# Patient Record
Sex: MALE | Race: BLACK OR AFRICAN AMERICAN | NOT HISPANIC OR LATINO | Employment: UNEMPLOYED | URBAN - METROPOLITAN AREA
[De-identification: names, ages, dates, MRNs, and addresses within clinical notes are randomized per-mention and may not be internally consistent; named-entity substitution may affect disease eponyms.]

---

## 2017-09-12 ENCOUNTER — HOSPITAL ENCOUNTER (EMERGENCY)
Facility: HOSPITAL | Age: 32
Discharge: HOME | End: 2017-09-12
Attending: EMERGENCY MEDICINE
Payer: COMMERCIAL

## 2017-09-12 VITALS
BODY MASS INDEX: 25.84 KG/M2 | OXYGEN SATURATION: 100 % | DIASTOLIC BLOOD PRESSURE: 69 MMHG | TEMPERATURE: 98 F | WEIGHT: 175 LBS | SYSTOLIC BLOOD PRESSURE: 127 MMHG | RESPIRATION RATE: 17 BRPM | HEART RATE: 71 BPM

## 2017-09-12 DIAGNOSIS — R10.9 LEFT FLANK PAIN: Primary | ICD-10-CM

## 2017-09-12 LAB
ALBUMIN SERPL BCP-MCNC: 3.9 G/DL
ALP SERPL-CCNC: 62 U/L
ALT SERPL W/O P-5'-P-CCNC: 20 U/L
ANION GAP SERPL CALC-SCNC: 8 MMOL/L
AST SERPL-CCNC: 22 U/L
BASOPHILS # BLD AUTO: 0 K/UL
BASOPHILS NFR BLD: 0.3 %
BILIRUB SERPL-MCNC: 0.9 MG/DL
BILIRUB UR QL STRIP: NEGATIVE
BUN SERPL-MCNC: 15 MG/DL
CALCIUM SERPL-MCNC: 9.2 MG/DL
CHLORIDE SERPL-SCNC: 105 MMOL/L
CLARITY UR: CLEAR
CO2 SERPL-SCNC: 27 MMOL/L
COLOR UR: YELLOW
CREAT SERPL-MCNC: 0.8 MG/DL
DIFFERENTIAL METHOD: ABNORMAL
EOSINOPHIL # BLD AUTO: 0.2 K/UL
EOSINOPHIL NFR BLD: 3.2 %
ERYTHROCYTE [DISTWIDTH] IN BLOOD BY AUTOMATED COUNT: 12.9 %
EST. GFR  (AFRICAN AMERICAN): >60 ML/MIN/1.73 M^2
EST. GFR  (NON AFRICAN AMERICAN): >60 ML/MIN/1.73 M^2
GLUCOSE SERPL-MCNC: 76 MG/DL
GLUCOSE UR QL STRIP: NEGATIVE
HCT VFR BLD AUTO: 41.5 %
HGB BLD-MCNC: 14.6 G/DL
HGB UR QL STRIP: NEGATIVE
KETONES UR QL STRIP: NEGATIVE
LEUKOCYTE ESTERASE UR QL STRIP: NEGATIVE
LYMPHOCYTES # BLD AUTO: 1.7 K/UL
LYMPHOCYTES NFR BLD: 25.1 %
MCH RBC QN AUTO: 31.5 PG
MCHC RBC AUTO-ENTMCNC: 35.1 G/DL
MCV RBC AUTO: 90 FL
MONOCYTES # BLD AUTO: 0.3 K/UL
MONOCYTES NFR BLD: 4.8 %
NEUTROPHILS # BLD AUTO: 4.6 K/UL
NEUTROPHILS NFR BLD: 66.6 %
NITRITE UR QL STRIP: NEGATIVE
PH UR STRIP: 7 [PH] (ref 5–8)
PLATELET # BLD AUTO: 214 K/UL
PMV BLD AUTO: 8.9 FL
POTASSIUM SERPL-SCNC: 3.8 MMOL/L
PROT SERPL-MCNC: 7 G/DL
PROT UR QL STRIP: NEGATIVE
RBC # BLD AUTO: 4.62 M/UL
SODIUM SERPL-SCNC: 140 MMOL/L
SP GR UR STRIP: 1.01 (ref 1–1.03)
URN SPEC COLLECT METH UR: NORMAL
UROBILINOGEN UR STRIP-ACNC: NEGATIVE EU/DL
WBC # BLD AUTO: 6.9 K/UL

## 2017-09-12 PROCEDURE — 81003 URINALYSIS AUTO W/O SCOPE: CPT

## 2017-09-12 PROCEDURE — 25000003 PHARM REV CODE 250: Performed by: PHYSICIAN ASSISTANT

## 2017-09-12 PROCEDURE — 36415 COLL VENOUS BLD VENIPUNCTURE: CPT

## 2017-09-12 PROCEDURE — 96374 THER/PROPH/DIAG INJ IV PUSH: CPT

## 2017-09-12 PROCEDURE — 99284 EMERGENCY DEPT VISIT MOD MDM: CPT | Mod: 25

## 2017-09-12 PROCEDURE — 80053 COMPREHEN METABOLIC PANEL: CPT

## 2017-09-12 PROCEDURE — 63600175 PHARM REV CODE 636 W HCPCS: Performed by: PHYSICIAN ASSISTANT

## 2017-09-12 PROCEDURE — 85025 COMPLETE CBC W/AUTO DIFF WBC: CPT

## 2017-09-12 RX ORDER — KETOROLAC TROMETHAMINE 30 MG/ML
15 INJECTION, SOLUTION INTRAMUSCULAR; INTRAVENOUS
Status: COMPLETED | OUTPATIENT
Start: 2017-09-12 | End: 2017-09-12

## 2017-09-12 RX ORDER — NAPROXEN 500 MG/1
500 TABLET ORAL 2 TIMES DAILY WITH MEALS
Qty: 10 TABLET | Refills: 0 | Status: SHIPPED | OUTPATIENT
Start: 2017-09-12 | End: 2017-09-17

## 2017-09-12 RX ORDER — METHOCARBAMOL 500 MG/1
500 TABLET, FILM COATED ORAL 3 TIMES DAILY
Qty: 15 TABLET | Refills: 0 | Status: SHIPPED | OUTPATIENT
Start: 2017-09-12 | End: 2017-09-17

## 2017-09-12 RX ADMIN — KETOROLAC TROMETHAMINE 15 MG: 30 INJECTION, SOLUTION INTRAMUSCULAR at 01:09

## 2017-09-12 RX ADMIN — SODIUM CHLORIDE 1000 ML: 0.9 INJECTION, SOLUTION INTRAVENOUS at 01:09

## 2017-09-12 NOTE — ED PROVIDER NOTES
Encounter Date: 9/12/2017       History     Chief Complaint   Patient presents with    Flank Pain     left. reports hx of kidney stone     Patient is a 32-year-old male who presents with left flank pain for 2 days.  He reports history of prior renal stones.  He reports associated dysuria.  He also reports mild nausea.  He denied fever, chills, vomiting, hematuria or change in bowel habits.  He denied any trauma.  He denied any attempted treatment.      The history is provided by the patient.     Review of patient's allergies indicates:  No Known Allergies  History reviewed. No pertinent past medical history.  Past Surgical History:   Procedure Laterality Date    FRACTURE SURGERY      right hand     History reviewed. No pertinent family history.  Social History   Substance Use Topics    Smoking status: Never Smoker    Smokeless tobacco: Never Used    Alcohol use Yes      Comment: occasionally     Review of Systems   Constitutional: Negative for chills and fever.   HENT: Negative for congestion and sore throat.    Respiratory: Negative for cough and shortness of breath.    Cardiovascular: Negative for chest pain.   Gastrointestinal: Positive for nausea. Negative for abdominal pain, diarrhea and vomiting.   Genitourinary: Positive for dysuria and flank pain. Negative for discharge, frequency, penile swelling and urgency.   Musculoskeletal: Negative for back pain.   Skin: Negative for rash.   Neurological: Negative for weakness.   Hematological: Does not bruise/bleed easily.       Physical Exam     Initial Vitals [09/12/17 1234]   BP Pulse Resp Temp SpO2   127/69 71 17 98.4 °F (36.9 °C) 100 %      MAP       88.33         Physical Exam    Nursing note and vitals reviewed.  Constitutional: He appears well-developed and well-nourished.   HENT:   Head: Normocephalic and atraumatic.   Right Ear: External ear normal.   Left Ear: External ear normal.   Nose: Nose normal.   Eyes: Conjunctivae are normal. Right eye exhibits  no discharge. Left eye exhibits no discharge. No scleral icterus.   Neck: Normal range of motion. Neck supple.   Cardiovascular: Normal rate, regular rhythm and normal heart sounds. Exam reveals no gallop and no friction rub.    No murmur heard.  Pulmonary/Chest: Breath sounds normal. He has no wheezes. He has no rhonchi. He has no rales.   Abdominal: Soft. Bowel sounds are normal. He exhibits no distension. There is no tenderness. There is CVA tenderness. There is no rigidity, no rebound and no guarding.   Mild left CVA and flank tenderness. Abdomen is soft and non-tender. There is no rebound or guarding.   Musculoskeletal: Normal range of motion. He exhibits no tenderness.   Neurological: He is oriented to person, place, and time.   Skin: Skin is warm and dry.         ED Course   Procedures  Labs Reviewed   CBC W/ AUTO DIFFERENTIAL - Abnormal; Notable for the following:        Result Value    MCH 31.5 (*)     MPV 8.9 (*)     All other components within normal limits   COMPREHENSIVE METABOLIC PANEL   URINALYSIS             Medical Decision Making:   History:   I obtained history from: someone other than patient.  Old Medical Records: I decided to obtain old medical records.  Clinical Tests:   Lab Tests: Ordered  Radiological Study: Ordered       APC / Resident Notes:   This is an urgent evaluation of a 32-year-old male who presents with left flank pain with associated nausea and dysuria.  He is well-appearing.  Vital signs are stable.  He has left CVA and flank pain.  There are no skin changes.  There is no bony tenderness to the ribs.  There are equal breath sounds laterally.  His abdomen is soft and nontender.  There is no rebound or guarding.  Labs are unremarkable.  UA is negative.  CT scan shows no renal stone.  He was given Toradol with resolution of his symptoms.  Suspect that secondary to musculoskeletal cause. Discussed results with patient. Return precautions given. Patient is to follow up with their  primary care provider. Case was discussed with Dr. Agosto who is in agreement with the plan of care. All questions answered.                 ED Course      Clinical Impression:   The encounter diagnosis was Left flank pain.                           Margo Phoenix PA-C  09/12/17 [id number]

## 2017-09-12 NOTE — DISCHARGE INSTRUCTIONS
Take medication as prescribed.  See your primary care provider in one week.  For worsening symptoms, chest pain, shortness of breath, increased abdominal pain, high grade fever, stroke or stroke like symptoms, immediately go to the nearest Emergency Room or call 911 as soon as possible.

## 2019-09-16 ENCOUNTER — TELEPHONE (OUTPATIENT)
Dept: UROLOGY | Facility: CLINIC | Age: 34
End: 2019-09-16

## 2019-09-16 ENCOUNTER — HOSPITAL ENCOUNTER (OUTPATIENT)
Dept: RADIOLOGY | Facility: HOSPITAL | Age: 34
Discharge: HOME | End: 2019-09-16
Attending: UROLOGY
Payer: COMMERCIAL

## 2019-09-16 ENCOUNTER — OFFICE VISIT (OUTPATIENT)
Dept: UROLOGY | Facility: CLINIC | Age: 34
End: 2019-09-16
Payer: COMMERCIAL

## 2019-09-16 VITALS
WEIGHT: 179.69 LBS | BODY MASS INDEX: 26.62 KG/M2 | HEART RATE: 92 BPM | DIASTOLIC BLOOD PRESSURE: 78 MMHG | TEMPERATURE: 98 F | SYSTOLIC BLOOD PRESSURE: 128 MMHG | HEIGHT: 69 IN

## 2019-09-16 DIAGNOSIS — N45.3 ORCHITIS, EPIDIDYMITIS, AND EPIDIDYMO-ORCHITIS: Primary | ICD-10-CM

## 2019-09-16 DIAGNOSIS — Z87.442 HISTORY OF KIDNEY STONES: ICD-10-CM

## 2019-09-16 DIAGNOSIS — R10.9 LEFT FLANK PAIN: ICD-10-CM

## 2019-09-16 DIAGNOSIS — N45.1 ORCHITIS, EPIDIDYMITIS, AND EPIDIDYMO-ORCHITIS: Primary | ICD-10-CM

## 2019-09-16 DIAGNOSIS — N45.2 ORCHITIS, EPIDIDYMITIS, AND EPIDIDYMO-ORCHITIS: Primary | ICD-10-CM

## 2019-09-16 PROCEDURE — 3008F PR BODY MASS INDEX (BMI) DOCUMENTED: ICD-10-PCS | Mod: CPTII,S$GLB,, | Performed by: UROLOGY

## 2019-09-16 PROCEDURE — 3008F BODY MASS INDEX DOCD: CPT | Mod: CPTII,S$GLB,, | Performed by: UROLOGY

## 2019-09-16 PROCEDURE — 74018 XR ABDOMEN AP 1 VIEW: ICD-10-PCS | Mod: 26,,, | Performed by: RADIOLOGY

## 2019-09-16 PROCEDURE — 99999 PR PBB SHADOW E&M-EST. PATIENT-LVL III: CPT | Mod: PBBFAC,,, | Performed by: UROLOGY

## 2019-09-16 PROCEDURE — 99203 OFFICE O/P NEW LOW 30 MIN: CPT | Mod: S$GLB,,, | Performed by: UROLOGY

## 2019-09-16 PROCEDURE — 74018 RADEX ABDOMEN 1 VIEW: CPT | Mod: TC,FY

## 2019-09-16 PROCEDURE — 99999 PR PBB SHADOW E&M-EST. PATIENT-LVL III: ICD-10-PCS | Mod: PBBFAC,,, | Performed by: UROLOGY

## 2019-09-16 PROCEDURE — 74018 RADEX ABDOMEN 1 VIEW: CPT | Mod: 26,,, | Performed by: RADIOLOGY

## 2019-09-16 PROCEDURE — 99203 PR OFFICE/OUTPT VISIT, NEW, LEVL III, 30-44 MIN: ICD-10-PCS | Mod: S$GLB,,, | Performed by: UROLOGY

## 2019-09-16 RX ORDER — DOXYCYCLINE 100 MG/1
100 CAPSULE ORAL 2 TIMES DAILY
Qty: 20 CAPSULE | Refills: 0 | Status: SHIPPED | OUTPATIENT
Start: 2019-09-16 | End: 2023-03-30

## 2019-09-16 RX ORDER — CIPROFLOXACIN 500 MG/1
500 TABLET ORAL 2 TIMES DAILY
Qty: 20 TABLET | Refills: 0 | Status: SHIPPED | OUTPATIENT
Start: 2019-09-16 | End: 2019-09-26

## 2019-09-16 NOTE — TELEPHONE ENCOUNTER
Spoke w pt regarding reason for same day visit with fever and chills. Pt was informed will need to visit ER for symptoms for further workup which pt declined due to high ER deductible. Pt voiced that he is currently not experincing any  fever or chills today per pt hasn't occurred since Friday only having kidney pain. Pt will keep appt informed.

## 2019-09-16 NOTE — TELEPHONE ENCOUNTER
----- Message from Herbie Ross sent at 9/16/2019  1:23 PM CDT -----  Contact: patient   Pt need to speak with a nurse regarding his prescriptions, please call back at [phone]

## 2019-09-16 NOTE — TELEPHONE ENCOUNTER
Patient with known history of kidney stones just added on by phone room with note - pain since Thursday, chills, fever.    Please advise pt that with these symptoms he should present to emergency room asap. Should not eat or drink until worked up further

## 2019-09-16 NOTE — PROGRESS NOTES
[facility]rology New Patient/H&P:    Bonifacio Bower is a 34 y.o. male who presents for urgent evaluation of flank pain, fever, chills, with history of kidney stones    ER 11/18/16: SP/flank pain. 3 mm left UVJ stone without hydronephrosis. Pt's pain is being appropriately controlled. Discharge on Flomax, antiemetics, and pain meds. Provided contact for urologist.  ER 9/12/17: L flank pain x2d with dysuria, +L CVAT. CT scan shows no renal stone.  He was given Toradol with resolution of his symptoms.  Suspect that secondary to musculoskeletal cause    He presents today noting:  Doesn't feel like a stone per se, though has had a kidney infection before and it feels like that  4d ago woke up with chills and had 102 fever and took tylenol and since then has had urinary frequency since then.  Feels tense lower back above hips, radiating towards groin, though this is both sides.  Has had stones 4x and 2 kidney infections. Stones started around 2011. Always passed them. Never caught/strained.  Only saw urology, Dr Delong, last year for vasectomy - no pain no mention of or workup of stones  No perineal, perirectal pain. No pain with ejaculation. Has had those symptoms before and evaluated by Dr Delong and noted nothing wrong.  No dysuria.  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  No high risk sexual encounters or STD concerns  When started feeling febrile had lower back aches.  Changed diet to cut out soft drinks, limited red meat, etc and hasnt had further problems with kidneys    History reviewed. No pertinent past medical history.    Past Surgical History:   Procedure Laterality Date    FRACTURE SURGERY      right hand       History reviewed. No pertinent family history.    Social History     Socioeconomic History    Marital status:      Spouse name: Not on file    Number of children: Not  on file    Years of education: Not on file    Highest education level: Not on file   Occupational History    Not on file   Social Needs    Financial resource strain: Not on file    Food insecurity:     Worry: Not on file     Inability: Not on file    Transportation needs:     Medical: Not on file     Non-medical: Not on file   Tobacco Use    Smoking status: Never Smoker    Smokeless tobacco: Never Used   Substance and Sexual Activity    Alcohol use: Yes     Comment: occasionally    Drug use: No    Sexual activity: Yes     Partners: Female   Lifestyle    Physical activity:     Days per week: Not on file     Minutes per session: Not on file    Stress: Not on file   Relationships    Social connections:     Talks on phone: Not on file     Gets together: Not on file     Attends Orthodoxy service: Not on file     Active member of club or organization: Not on file     Attends meetings of clubs or organizations: Not on file     Relationship status: Not on file   Other Topics Concern    Not on file   Social History Narrative    Not on file       Review of patient's allergies indicates:  No Known Allergies    Medications Reviewed: see MAR    ROS:    Constitutional: denies fevers, chills, night sweats, fatigue, malaise  Respiratory: negative for cough, shortness of breath, wheezing, dyspnea.  Cardiovascular:  negative for chest pain, varicose veins, ankle swelling, palpitations, syncope.  GI: negative for abdominal pain, heartburn, indigestion, nausea, vomiting, constipation, diarrhea, blood in stool.   Urology: as noted above in HPI  Endocrinology: negative for cold intolerance, excessive thirst, not feeling tired/sluggish, no heat intolerance.   Hematology/Lymph: negative for easy bleeding, easy bruising, swollen glands.  Musculoskeletal: negative for back pain, joint pain, joint swelling, neck pain.  Allergy-Immunology: negative for seasonal allergies, negative for unusual infections.   Skin: negative for  "boils, breast lumps, hives, itching, rash.   Neurology: negative for, dizziness, headache, tingling/numbness, tremors.   Psych: satisfied with life; negative for, anxiety, depression, suicidal thoughts.     PHYSICAL EXAM:    Vitals:    09/16/19 1056   BP: 128/78   Pulse: 92   Temp: 98.2 °F (36.8 °C)     Body mass index is 26.53 kg/m². Weight: 81.5 kg (179 lb 10.8 oz) Height: 5' 9" (175.3 cm)       General: Alert, cooperative, no distress, appears stated age  Head: Normocephalic, without obvious abnormality, atraumatic  Neck: no masses, no thyromegaly, no lymphadenopathy  Eyes: PERRL, conjunctiva/corneas clear  Lungs: Respirations unlabored, normal effort, no accessory muscle use  CV: Warm and well perfused extremities  Abdomen: Soft, non-tender, no CVA tenderness, no hepatosplenomegaly, no hernia  Penis: phallus normal, circumcised, well cared for, no plaques or lesions.   Scrotum: no cysts, no lesions, no rash, no hydrocele.   Epididymes: normal, nontender, symmetrical, + L enlargement and TTP  Testes: normal, both descended, no masses. Left swollen, warm, TTP   Urethra: palpably normal with orthotopic meatus of normal size    FABBY: difficult to fully examine due to poor tolerance, only apex palpable, no gross bogginess  Extremities: Extremities normal, atraumatic, no cyanosis or edema  Skin: Normal color, texture, and turgor, no rashes or lesions  Psych: Appropriate, well oriented, normal affect, normal mood  Neuro: Non-focal    Assessment/Diagnosis:    1. Orchitis, epididymitis, and epididymo-orchitis     2. Left flank pain  POCT URINE DIPSTICK WITHOUT MICROSCOPE   3. History of kidney stones         Plans:  On exam he does have hyperemia of the left testicle and epididymis which are both tender to palpation and warm.  His clinical presentation is most consistent with left epididymo-orchitis.  May be an element of prostatitis, though difficult to discern due to intolerance of exam.  We did discuss antibiotic " treatment of the same.  His urinalysis dipstick is completely negative today and I do not suspect concurrent UTI or kidney infection.  His symptoms are not classic of kidney stone and his pain and his back is bilateral and near SI joint and likely musculoskeletal.    Discussed treatment course of epididymo-orchitis, including antibiotics, scheduled NSAIDs, and scrotal support.  The given number prescription for both doxycycline and Cipro, 10 day course each, to complete 1 after the other for a total of 20 days of treatment.  If symptoms do not resolve after this time can call back and would obtain scrotal ultrasound for further evaluation.  Otherwise he can follow up on as-needed basis.    He has been instructed that if fever or chills resume or return, he should present to the emergency department for evaluation for admission with IV antibiotics.  Today he is afebrile and vital signs are normal and has been feeling well since initial onset of chills days ago so will start with outpatient treatment as above.    Again, not suspicious that this is related to kidney stone, and he is not really having flank pain but more low back pain/SI joint pain.  Reviewed recommendations for stone prevention such as adequate daily hydration to produce goal daily UOP > 2L, low salt low sodium moderate protein diet, avoiding tea and other oxalate rich foods, and use of fresh lemon as citrate is a natural stone inhibitor.  Will however get a KUB on the way out today as a screening measure for any genitourinary tract calcifications.  If KUB negative proceed as above, and if any concern, can order further imaging to rule out any kidney stones.

## 2020-02-05 ENCOUNTER — HOSPITAL ENCOUNTER (EMERGENCY)
Facility: HOSPITAL | Age: 35
Discharge: HOME | End: 2020-02-05
Attending: EMERGENCY MEDICINE
Payer: COMMERCIAL

## 2020-02-05 VITALS
OXYGEN SATURATION: 98 % | BODY MASS INDEX: 26.81 KG/M2 | SYSTOLIC BLOOD PRESSURE: 116 MMHG | HEIGHT: 69 IN | WEIGHT: 181 LBS | HEART RATE: 59 BPM | DIASTOLIC BLOOD PRESSURE: 70 MMHG | RESPIRATION RATE: 17 BRPM | TEMPERATURE: 98 F

## 2020-02-05 DIAGNOSIS — R07.9 CHEST PAIN: ICD-10-CM

## 2020-02-05 PROCEDURE — 93005 ELECTROCARDIOGRAM TRACING: CPT

## 2020-02-05 PROCEDURE — 99283 EMERGENCY DEPT VISIT LOW MDM: CPT | Mod: 25

## 2020-02-06 NOTE — ED NOTES
C/o dizziness on and off throughout the day today and left sided chest pain that started today. Alert calm even non labored respirations. Call light in reach aware to notify nurse of needs or concerns.

## 2020-02-06 NOTE — ED PROVIDER NOTES
Encounter Date: 2/5/2020    SCRIBE #1 NOTE: I, Cinthya Patel, am scribing for, and in the presence of, Dr. Veronica.       History     Chief Complaint   Patient presents with    Dizziness     intermittent dizziness throughout the day.... also complains of left chest discomfort        Time seen by provider: 10:30 PM on 02/05/2020    Bonifacio Bower is a 34 y.o. male  who presents to the ED with c/o intermittent dizziness that has been going on throughout the day. He notes associated left sided chest pain this morning and was feeling dizzy and light headed while waking around at work. Patient states he drank a sugary coffee with some improvement. He notes tension in his chest when he bends his head forward. Patient states he exercises daily. No n/v/d, fever, SOB, leg tenderness, or HA. No cardiac PMHx or PSHx. NKDA. Patient states he stopped smoking cigarettes and marijuana one week ago.     The history is provided by the patient.     Review of patient's allergies indicates:  No Known Allergies  No past medical history on file.  Past Surgical History:   Procedure Laterality Date    FRACTURE SURGERY      right hand     No family history on file.  Social History     Tobacco Use    Smoking status: Never Smoker    Smokeless tobacco: Never Used   Substance Use Topics    Alcohol use: Yes     Comment: occasionally    Drug use: No     Review of Systems   Constitutional: Negative for fever.   HENT: Negative for sore throat.    Respiratory: Negative for shortness of breath.    Cardiovascular: Positive for chest pain (left side).   Gastrointestinal: Negative for diarrhea, nausea and vomiting.   Genitourinary: Negative for dysuria.   Musculoskeletal: Negative for back pain and myalgias.   Skin: Negative for rash.   Neurological: Positive for dizziness, light-headedness and headaches. Negative for weakness.       Physical Exam     Initial Vitals [02/05/20 2149]   BP Pulse Resp Temp SpO2   130/83 70 17 98.2 °F (36.8 °C) 97 %       MAP       --         Physical Exam    Nursing note and vitals reviewed.  Constitutional: He appears well-developed and well-nourished. No distress.   Non-toxic, well-appearing male.   HENT:   Head: Normocephalic and atraumatic.   Eyes: Conjunctivae and EOM are normal. Pupils are equal, round, and reactive to light.   Neck: Neck supple.   Cardiovascular: Normal rate, regular rhythm and normal heart sounds. Exam reveals no gallop and no friction rub.    No murmur heard.  Pulmonary/Chest: Breath sounds normal. No respiratory distress. He has no wheezes. He has no rhonchi. He has no rales. He exhibits tenderness.   Mild reproducible mid thoracic tenderness. Reproducible tenderness over the left later chest wall over a new tattoo.    Abdominal: Soft. Bowel sounds are normal. He exhibits no distension. There is no tenderness.   Musculoskeletal: Normal range of motion. He exhibits no edema or tenderness.   Neurological: He is alert and oriented to person, place, and time.   Skin: Skin is warm and dry.   Psychiatric: He has a normal mood and affect.         ED Course   Procedures  Labs Reviewed - No data to display  EKG Readings: (Independently Interpreted)   Initial Reading: No STEMI. Rhythm: Sinus Bradycardia. Heart Rate: 59.   Benign repolarization with segment depression. No prior EKG for comparison.        Imaging Results    None          Medical Decision Making:   History:   Old Medical Records: I decided to obtain old medical records.  Independently Interpreted Test(s):   I have ordered and independently interpreted EKG Reading(s) - see prior notes  Clinical Tests:   Medical Tests: Ordered and Reviewed  Patient has very atypical chest pain.  It is reproducible on hurts more when he flexes his neck.  He has been working out maybe has a musculoskeletal strain or mild thoracic radiculopathy.  Lungs are clear heart sounds are normal. EKG is benign early repolarization.  Symptoms are not consistent with angina.   Stable for discharge at this time with return precautions.  He can take anti-inflammatories.            Scribe Attestation:   Scribe #1: I performed the above scribed service and the documentation accurately describes the services I performed. I attest to the accuracy of the note.                   I, Dr. Abhilash Veronica personally performed the services described in this documentation. All medical record entries made by the scribe were at my direction and in my presence.  I have reviewed the chart and agree that the record reflects my personal performance and is accurate and complete. Abhilash Veronica MD.  3:10 AM 02/06/2020    DISCLAIMER: This note was prepared with Dragon NaturallySpeaking voice recognition transcription software. Garbled syntax, mangled pronouns, and other bizarre constructions may be attributed to that software system         Clinical Impression:       ICD-10-CM ICD-9-CM   1. Chest pain R07.9 786.50         Disposition:   Disposition: Discharged  Condition: Stable                     Abhilash Veronica MD  02/06/20 9810

## 2020-02-06 NOTE — DISCHARGE INSTRUCTIONS
Your chest pain is likely from nerves and muscles.  Take anti-inflammatories.  Drink plenty of fluids.  Return to the ER for worsening pain fevers cough increasing shortness of breath or any other concerns.

## 2023-03-25 ENCOUNTER — HOSPITAL ENCOUNTER (EMERGENCY)
Facility: HOSPITAL | Age: 38
Discharge: HOME | End: 2023-03-25
Attending: EMERGENCY MEDICINE
Payer: MEDICAID

## 2023-03-25 VITALS
OXYGEN SATURATION: 98 % | SYSTOLIC BLOOD PRESSURE: 123 MMHG | HEART RATE: 62 BPM | DIASTOLIC BLOOD PRESSURE: 70 MMHG | TEMPERATURE: 98 F | RESPIRATION RATE: 17 BRPM

## 2023-03-25 DIAGNOSIS — R10.32 LEFT LOWER QUADRANT ABDOMINAL PAIN: Primary | ICD-10-CM

## 2023-03-25 LAB
AMORPH CRY URNS QL MICRO: ABNORMAL
ANION GAP SERPL CALC-SCNC: 10 MMOL/L (ref 8–16)
BACTERIA #/AREA URNS HPF: ABNORMAL /HPF
BASOPHILS # BLD AUTO: 0.05 K/UL (ref 0–0.2)
BASOPHILS NFR BLD: 0.7 % (ref 0–1.9)
BILIRUB UR QL STRIP: NEGATIVE
BUN SERPL-MCNC: 15 MG/DL (ref 6–20)
CALCIUM SERPL-MCNC: 9.5 MG/DL (ref 8.7–10.5)
CHLORIDE SERPL-SCNC: 106 MMOL/L (ref 95–110)
CLARITY UR: ABNORMAL
CO2 SERPL-SCNC: 22 MMOL/L (ref 23–29)
COLOR UR: ABNORMAL
CREAT SERPL-MCNC: 1.1 MG/DL (ref 0.5–1.4)
DIFFERENTIAL METHOD: ABNORMAL
EOSINOPHIL # BLD AUTO: 0.2 K/UL (ref 0–0.5)
EOSINOPHIL NFR BLD: 2.5 % (ref 0–8)
ERYTHROCYTE [DISTWIDTH] IN BLOOD BY AUTOMATED COUNT: 12.7 % (ref 11.5–14.5)
EST. GFR  (NO RACE VARIABLE): >60 ML/MIN/1.73 M^2
GLUCOSE SERPL-MCNC: 77 MG/DL (ref 70–110)
GLUCOSE UR QL STRIP: NEGATIVE
HCT VFR BLD AUTO: 43.4 % (ref 40–54)
HGB BLD-MCNC: 14.7 G/DL (ref 14–18)
HGB UR QL STRIP: ABNORMAL
IMM GRANULOCYTES # BLD AUTO: 0.03 K/UL (ref 0–0.04)
IMM GRANULOCYTES NFR BLD AUTO: 0.4 % (ref 0–0.5)
KETONES UR QL STRIP: NEGATIVE
LEUKOCYTE ESTERASE UR QL STRIP: ABNORMAL
LYMPHOCYTES # BLD AUTO: 1.6 K/UL (ref 1–4.8)
LYMPHOCYTES NFR BLD: 21 % (ref 18–48)
MCH RBC QN AUTO: 31.1 PG (ref 27–31)
MCHC RBC AUTO-ENTMCNC: 33.9 G/DL (ref 32–36)
MCV RBC AUTO: 92 FL (ref 82–98)
MICROSCOPIC COMMENT: ABNORMAL
MONOCYTES # BLD AUTO: 0.6 K/UL (ref 0.3–1)
MONOCYTES NFR BLD: 7.6 % (ref 4–15)
NEUTROPHILS # BLD AUTO: 5.2 K/UL (ref 1.8–7.7)
NEUTROPHILS NFR BLD: 67.8 % (ref 38–73)
NITRITE UR QL STRIP: NEGATIVE
NRBC BLD-RTO: 0 /100 WBC
PH UR STRIP: 8 [PH] (ref 5–8)
PLATELET # BLD AUTO: 222 K/UL (ref 150–450)
PMV BLD AUTO: 11.2 FL (ref 9.2–12.9)
POTASSIUM SERPL-SCNC: 3.8 MMOL/L (ref 3.5–5.1)
PROT UR QL STRIP: ABNORMAL
RBC # BLD AUTO: 4.72 M/UL (ref 4.6–6.2)
RBC #/AREA URNS HPF: >100 /HPF (ref 0–4)
SODIUM SERPL-SCNC: 138 MMOL/L (ref 136–145)
SP GR UR STRIP: 1.01 (ref 1–1.03)
SQUAMOUS #/AREA URNS HPF: 2 /HPF
URN SPEC COLLECT METH UR: ABNORMAL
UROBILINOGEN UR STRIP-ACNC: NEGATIVE EU/DL
WBC # BLD AUTO: 7.68 K/UL (ref 3.9–12.7)
WBC #/AREA URNS HPF: 29 /HPF (ref 0–5)

## 2023-03-25 PROCEDURE — 87086 URINE CULTURE/COLONY COUNT: CPT | Performed by: EMERGENCY MEDICINE

## 2023-03-25 PROCEDURE — 80048 BASIC METABOLIC PNL TOTAL CA: CPT | Performed by: EMERGENCY MEDICINE

## 2023-03-25 PROCEDURE — 81000 URINALYSIS NONAUTO W/SCOPE: CPT | Performed by: EMERGENCY MEDICINE

## 2023-03-25 PROCEDURE — 99284 EMERGENCY DEPT VISIT MOD MDM: CPT | Mod: 25

## 2023-03-25 PROCEDURE — 36415 COLL VENOUS BLD VENIPUNCTURE: CPT | Performed by: EMERGENCY MEDICINE

## 2023-03-25 PROCEDURE — 85025 COMPLETE CBC W/AUTO DIFF WBC: CPT | Performed by: EMERGENCY MEDICINE

## 2023-03-25 NOTE — ED PROVIDER NOTES
Chief complaint:  Abdominal Pain (Left side x 1 day / kidney stone 4 years ago / sent from urgent care)      HPI:  Bonifacio Bower is a 37 y.o. male with hx ureterolithiasis presenting with less than 1 day of colicky, intermittent left lower quadrant abdominal pain, sometimes sharp in nature.  Currently mild after taking ibuprofen.  No radiation or migration of pain.  No associated testicular pain, emesis.  He has noticed some darker urine and possibly hematuria.  He was seen in urgent care and forwarded to the emergency department for possible kidney stone.  This does feel similar to 1 prior episode of kidney stone that resolved without intervention.    ROS: As per HPI and below:  No vomiting, flank pain, diarrhea, rash, dysuria, frequency, urgency.    Review of patient's allergies indicates:  No Known Allergies    Discharge Medication List as of 3/25/2023  2:34 PM        CONTINUE these medications which have NOT CHANGED    Details   doxycycline (VIBRAMYCIN) 100 MG Cap Take 1 capsule (100 mg total) by mouth 2 (two) times daily. Complete 1st, Starting Mon 9/16/2019, Normal             PMH:  As per HPI and below:  No past medical history on file.  Past Surgical History:   Procedure Laterality Date    FRACTURE SURGERY      right hand       Social History     Socioeconomic History    Marital status:    Tobacco Use    Smoking status: Never    Smokeless tobacco: Never   Substance and Sexual Activity    Alcohol use: Yes     Comment: occasionally    Drug use: No    Sexual activity: Yes     Partners: Female       No family history on file.    Physical Exam:    Vitals:    03/25/23 1414   BP:    Pulse: 62   Resp: 17   Temp:      GENERAL:  No apparent distress.  Alert.    HEENT:  Moist mucous membranes.  Normocephalic and atraumatic.    NECK:  No swelling.  Midline trachea.   CARDIOVASCULAR:  Regular rate and rhythm.  2+ radial pulses.    PULMONARY:  Lungs clear to auscultation bilaterally.  No wheezes, rales, or  rhonci.    ABDOMEN:  Non-tender and non-distended.    EXTREMITIES:  Warm and well perfused.  Brisk capillary refill.    NEUROLOGICAL:  Normal mental status.  Appropriate and conversant.    SKIN:  No rashes or ecchymoses.    BACK:  Atraumatic.  No CVA tenderness to palpation.      Labs Reviewed   URINALYSIS, REFLEX TO URINE CULTURE - Abnormal; Notable for the following components:       Result Value    Color, UA Brown (*)     Appearance, UA Cloudy (*)     Protein, UA Trace (*)     Occult Blood UA 3+ (*)     Leukocytes, UA Trace (*)     All other components within normal limits    Narrative:     Specimen Source->Urine   URINALYSIS MICROSCOPIC - Abnormal; Notable for the following components:    RBC, UA >100 (*)     WBC, UA 29 (*)     Amorphous, UA Many (*)     All other components within normal limits    Narrative:     Specimen Source->Urine   CBC W/ AUTO DIFFERENTIAL - Abnormal; Notable for the following components:    MCH 31.1 (*)     All other components within normal limits   BASIC METABOLIC PANEL - Abnormal; Notable for the following components:    CO2 22 (*)     All other components within normal limits   CULTURE, URINE       Discharge Medication List as of 3/25/2023  2:34 PM        CONTINUE these medications which have NOT CHANGED    Details   doxycycline (VIBRAMYCIN) 100 MG Cap Take 1 capsule (100 mg total) by mouth 2 (two) times daily. Complete 1st, Starting Mon 9/16/2019, Normal             Orders Placed This Encounter   Procedures    CT Renal Stone Study ABD Pelvis WO    Urinalysis, Reflex to Urine Culture Urine, Clean Catch    Urinalysis Microscopic    CBC Auto Differential    Basic Metabolic Panel       Imaging Results              CT Renal Stone Study ABD Pelvis WO (Final result)  Result time 03/25/23 13:48:12      Final result by Pratik Tom Jr., MD (03/25/23 13:48:12)                   Impression:      2 mm left intrarenal stone without hydronephrosis.  1.8 cm left renal  cyst.      Electronically signed by: Pratik Tom MD  Date:    03/25/2023  Time:    13:48               Narrative:    EXAMINATION:  CT RENAL STONE STUDY ABD PELVIS WO    CLINICAL HISTORY:  Flank pain, kidney stone suspected;LLQ abdominal pain;    TECHNIQUE:  Low dose axial images, sagittal and coronal reformations were obtained from the lung bases to the pubic symphysis.  Contrast was not administered.    COMPARISON:  CT abdomen of September 12, 2017    FINDINGS:  The liver is of normal size contour and CT density without focal mass.  The gallbladder is of normal size without CT evidence of stone.  The pancreas is of normal contour and CT density without edema or mass.  The spleen is of normal size and CT density.    The adrenal glands are not enlarged.  The kidneys are of normal size and CT density for a noncontrast study.  The in the midpole of the left kidney is a water density 1.8 cm cyst.  In the lower pole of the left kidney is a small 2 mm indistinct stone.  Hydronephrosis is not seen on either side.  The abdominal aorta and inferior vena cava are of normal caliber.    The stomach is of normal configuration.  Small bowel dilatation or air-fluid levels are not seen.  The colon appears of normal configuration without distention or mass.  Free fluid or free air is not seen.  A normal appendix is noted.    The bladder is of normal contour without mass or asymmetry.  The prostate is not enlarged.                                  (Rad read)    ED Course as of 03/25/23 1731   Sat Mar 25, 2023   1304 Microscopic Comment: SEE COMMENT [MR]      ED Course User Index  [MR] Miah Zamora MD       MDM:    37 y.o. male with intermittent left lower quadrant pain consistent with ureterolithiasis.  Patient is well-appearing.  Urinalysis sent to exclude infection.  Urinalysis is equivocal for infection with blood but also significant white cells.  Patient notably has no urinary complaints apart from previous  hematuria that is cleared.  Pain is actually markedly better since earlier this morning and he declines analgesia.  CT done due to equivocal urine to exclude obstructive uropathy does not show any sign of acute process including obstructive uropathy.  I do not think requires emergent urologic intervention.  I doubt other life-threatening intra-abdominal process such as abscess, appendicitis, diverticulitis.  I suspect recently passed stone.  I did discuss possible antibiotic treatment versus follow-up of urine culture with PCP and patient strongly prefers the latter, which I feel is reasonable given his lack of ongoing or alternative symptoms.  Detailed return precautions reviewed.    Diagnoses:    1. LLQ abdominal pain       Miah Zamora MD  03/25/23 7053

## 2023-03-25 NOTE — ED NOTES
Pt reports LLQ pain x1 day, blood in urine, slight decrease in urine output. Pt denies nausea, vomiting, burning with urination.

## 2023-03-25 NOTE — DISCHARGE INSTRUCTIONS
There are some positive markers for infection in the urine, although this is uncertain in this urine culture may be followed up with your PCP for a more definitive answer.

## 2023-03-26 LAB — BACTERIA UR CULT: NORMAL

## 2023-03-29 ENCOUNTER — HOSPITAL ENCOUNTER (EMERGENCY)
Facility: HOSPITAL | Age: 38
Discharge: HOME | End: 2023-03-29
Attending: EMERGENCY MEDICINE
Payer: MEDICAID

## 2023-03-29 ENCOUNTER — TELEPHONE (OUTPATIENT)
Dept: UROLOGY | Facility: CLINIC | Age: 38
End: 2023-03-29
Payer: MEDICAID

## 2023-03-29 ENCOUNTER — PATIENT OUTREACH (OUTPATIENT)
Dept: EMERGENCY MEDICINE | Facility: HOSPITAL | Age: 38
End: 2023-03-29
Payer: MEDICAID

## 2023-03-29 VITALS
SYSTOLIC BLOOD PRESSURE: 128 MMHG | BODY MASS INDEX: 28.88 KG/M2 | OXYGEN SATURATION: 100 % | HEART RATE: 68 BPM | HEIGHT: 69 IN | WEIGHT: 195 LBS | RESPIRATION RATE: 16 BRPM | DIASTOLIC BLOOD PRESSURE: 70 MMHG | TEMPERATURE: 98 F

## 2023-03-29 DIAGNOSIS — R31.9 HEMATURIA, UNSPECIFIED TYPE: Primary | ICD-10-CM

## 2023-03-29 LAB
ALBUMIN SERPL BCP-MCNC: 4.3 G/DL (ref 3.5–5.2)
ALP SERPL-CCNC: 66 U/L (ref 55–135)
ALT SERPL W/O P-5'-P-CCNC: 21 U/L (ref 10–44)
ANION GAP SERPL CALC-SCNC: 10 MMOL/L (ref 8–16)
AST SERPL-CCNC: 24 U/L (ref 10–40)
BACTERIA #/AREA URNS HPF: ABNORMAL /HPF
BASOPHILS # BLD AUTO: 0.06 K/UL (ref 0–0.2)
BASOPHILS NFR BLD: 0.8 % (ref 0–1.9)
BILIRUB SERPL-MCNC: 1.2 MG/DL (ref 0.1–1)
BILIRUB UR QL STRIP: NEGATIVE
BUN SERPL-MCNC: 9 MG/DL (ref 6–20)
CALCIUM SERPL-MCNC: 9.1 MG/DL (ref 8.7–10.5)
CHLORIDE SERPL-SCNC: 104 MMOL/L (ref 95–110)
CLARITY UR: CLEAR
CO2 SERPL-SCNC: 23 MMOL/L (ref 23–29)
COLOR UR: YELLOW
CREAT SERPL-MCNC: 1.1 MG/DL (ref 0.5–1.4)
DIFFERENTIAL METHOD: ABNORMAL
EOSINOPHIL # BLD AUTO: 0.2 K/UL (ref 0–0.5)
EOSINOPHIL NFR BLD: 2.8 % (ref 0–8)
ERYTHROCYTE [DISTWIDTH] IN BLOOD BY AUTOMATED COUNT: 12.6 % (ref 11.5–14.5)
EST. GFR  (NO RACE VARIABLE): >60 ML/MIN/1.73 M^2
GLUCOSE SERPL-MCNC: 82 MG/DL (ref 70–110)
GLUCOSE UR QL STRIP: NEGATIVE
HCT VFR BLD AUTO: 47.5 % (ref 40–54)
HCV AB SERPL QL IA: NORMAL
HGB BLD-MCNC: 15.9 G/DL (ref 14–18)
HGB UR QL STRIP: ABNORMAL
HIV 1+2 AB+HIV1 P24 AG SERPL QL IA: NORMAL
IMM GRANULOCYTES # BLD AUTO: 0.02 K/UL (ref 0–0.04)
IMM GRANULOCYTES NFR BLD AUTO: 0.3 % (ref 0–0.5)
KETONES UR QL STRIP: NEGATIVE
LEUKOCYTE ESTERASE UR QL STRIP: NEGATIVE
LYMPHOCYTES # BLD AUTO: 1.6 K/UL (ref 1–4.8)
LYMPHOCYTES NFR BLD: 22 % (ref 18–48)
MCH RBC QN AUTO: 31.1 PG (ref 27–31)
MCHC RBC AUTO-ENTMCNC: 33.5 G/DL (ref 32–36)
MCV RBC AUTO: 93 FL (ref 82–98)
MICROSCOPIC COMMENT: ABNORMAL
MONOCYTES # BLD AUTO: 0.5 K/UL (ref 0.3–1)
MONOCYTES NFR BLD: 6.7 % (ref 4–15)
NEUTROPHILS # BLD AUTO: 5 K/UL (ref 1.8–7.7)
NEUTROPHILS NFR BLD: 67.4 % (ref 38–73)
NITRITE UR QL STRIP: NEGATIVE
NRBC BLD-RTO: 0 /100 WBC
PH UR STRIP: 6 [PH] (ref 5–8)
PLATELET # BLD AUTO: 203 K/UL (ref 150–450)
PMV BLD AUTO: 11.4 FL (ref 9.2–12.9)
POTASSIUM SERPL-SCNC: 3.7 MMOL/L (ref 3.5–5.1)
PROT SERPL-MCNC: 7.2 G/DL (ref 6–8.4)
PROT UR QL STRIP: NEGATIVE
RBC # BLD AUTO: 5.11 M/UL (ref 4.6–6.2)
RBC #/AREA URNS HPF: 14 /HPF (ref 0–4)
SODIUM SERPL-SCNC: 137 MMOL/L (ref 136–145)
SP GR UR STRIP: 1 (ref 1–1.03)
SQUAMOUS #/AREA URNS HPF: 0 /HPF
URN SPEC COLLECT METH UR: ABNORMAL
UROBILINOGEN UR STRIP-ACNC: NEGATIVE EU/DL
WBC # BLD AUTO: 7.44 K/UL (ref 3.9–12.7)
WBC #/AREA URNS HPF: 0 /HPF (ref 0–5)

## 2023-03-29 PROCEDURE — 25000003 PHARM REV CODE 250: Performed by: NURSE PRACTITIONER

## 2023-03-29 PROCEDURE — 99284 EMERGENCY DEPT VISIT MOD MDM: CPT | Mod: 25

## 2023-03-29 PROCEDURE — 86803 HEPATITIS C AB TEST: CPT | Performed by: EMERGENCY MEDICINE

## 2023-03-29 PROCEDURE — 85025 COMPLETE CBC W/AUTO DIFF WBC: CPT | Performed by: NURSE PRACTITIONER

## 2023-03-29 PROCEDURE — 36415 COLL VENOUS BLD VENIPUNCTURE: CPT | Performed by: EMERGENCY MEDICINE

## 2023-03-29 PROCEDURE — 81000 URINALYSIS NONAUTO W/SCOPE: CPT | Performed by: NURSE PRACTITIONER

## 2023-03-29 PROCEDURE — 96360 HYDRATION IV INFUSION INIT: CPT

## 2023-03-29 PROCEDURE — 80053 COMPREHEN METABOLIC PANEL: CPT | Performed by: NURSE PRACTITIONER

## 2023-03-29 PROCEDURE — 87389 HIV-1 AG W/HIV-1&-2 AB AG IA: CPT | Performed by: EMERGENCY MEDICINE

## 2023-03-29 RX ORDER — TAMSULOSIN HYDROCHLORIDE 0.4 MG/1
0.4 CAPSULE ORAL DAILY
Qty: 10 CAPSULE | Refills: 0 | Status: SHIPPED | OUTPATIENT
Start: 2023-03-29 | End: 2023-03-30 | Stop reason: SDUPTHER

## 2023-03-29 RX ORDER — KETOROLAC TROMETHAMINE 10 MG/1
10 TABLET, FILM COATED ORAL EVERY 6 HOURS
Qty: 20 TABLET | Refills: 0 | Status: SHIPPED | OUTPATIENT
Start: 2023-03-29 | End: 2023-03-30 | Stop reason: SDUPTHER

## 2023-03-29 RX ADMIN — SODIUM CHLORIDE 1000 ML: 9 INJECTION, SOLUTION INTRAVENOUS at 10:03

## 2023-03-29 NOTE — ED PROVIDER NOTES
"Encounter Date: 3/29/2023    SCRIBE #1 NOTE: INora, am scribing for, and in the presence of,  JUSTINE Anthony.     History     Chief Complaint   Patient presents with    Abdominal Pain     Lower abdominal pain and blood in his urine      Time seen by provider: 9:36 AM on 03/29/2023    Bonifacio Bower is a 37 y.o. male with no documented PMHx who presents to the ED for evaluation of lower abdominal pain, described as a "tightness" over the bladder, with associated bloody urine since last night.  Patient was evaluated in this ED for similar Sx 4 days ago where he had a CT performed with no significant findings.  He states experiencing kidney spasms x 2 minutes on the L last night as well.  Patient references a Hx of kidney stones and notes that his current Sx presents similarly.  Since his previous evaluation, he confirms an increase in water intake with Sx persisting.  He denies being on Abx currently and notes he has not had a urine culture performed recently.  The patient mentions pain at the end of urination with accompanying L lower back pain last night that has since resolved.  He notes an episode this morning in which his urine was "darker" and more foamy than normal.  Patient denies N/V or any other symptoms at this time.  No pertinent PSHx documented.  Social Hx includes the patient working as a .      The history is provided by the patient.   Review of patient's allergies indicates:  No Known Allergies  No past medical history on file.  Past Surgical History:   Procedure Laterality Date    FRACTURE SURGERY      right hand     No family history on file.  Social History     Tobacco Use    Smoking status: Never    Smokeless tobacco: Never   Substance Use Topics    Alcohol use: Yes     Comment: occasionally    Drug use: No     Review of Systems   Constitutional:  Positive for activity change (increases in water intake). Negative for fever.   HENT:  Negative for sore throat.  "   Respiratory:  Negative for shortness of breath.    Cardiovascular:  Negative for chest pain.   Gastrointestinal:  Positive for abdominal pain. Negative for nausea and vomiting.   Genitourinary:  Positive for dysuria and hematuria.        Positive for kidney spasms and darker/foamy urine.   Musculoskeletal:  Positive for back pain.   Skin:  Negative for rash.   Neurological:  Negative for weakness.   Hematological:  Does not bruise/bleed easily.     Physical Exam     Initial Vitals [03/29/23 0926]   BP Pulse Resp Temp SpO2   132/67 65 20 98.3 °F (36.8 °C) 100 %      MAP       --         Physical Exam    Nursing note and vitals reviewed.  Constitutional: Vital signs are normal. He appears well-developed and well-nourished.   HENT:   Head: Normocephalic and atraumatic.   Eyes: Pupils are equal, round, and reactive to light.   Neck: Neck supple.   Cardiovascular:  Normal rate, regular rhythm, normal heart sounds and intact distal pulses.     Exam reveals no gallop and no friction rub.       No murmur heard.  Pulmonary/Chest: Breath sounds normal. He has no wheezes. He has no rhonchi. He has no rales.   Abdominal: Abdomen is soft. Bowel sounds are normal. There is abdominal tenderness in the suprapubic area.   No right CVA tenderness.  No left CVA tenderness. There is no rebound and no guarding.   Musculoskeletal:      Cervical back: Neck supple.     Neurological: He is alert and oriented to person, place, and time. He has normal strength.   Skin: Skin is warm, dry and intact.   Psychiatric: He has a normal mood and affect. His speech is normal and behavior is normal.       ED Course   Procedures  Labs Reviewed   CBC W/ AUTO DIFFERENTIAL - Abnormal; Notable for the following components:       Result Value    MCH 31.1 (*)     All other components within normal limits   COMPREHENSIVE METABOLIC PANEL - Abnormal; Notable for the following components:    Total Bilirubin 1.2 (*)     All other components within normal limits    URINALYSIS, REFLEX TO URINE CULTURE - Abnormal; Notable for the following components:    Occult Blood UA 3+ (*)     All other components within normal limits    Narrative:     Specimen Source->Urine   URINALYSIS MICROSCOPIC - Abnormal; Notable for the following components:    RBC, UA 14 (*)     All other components within normal limits    Narrative:     Specimen Source->Urine   HIV 1 / 2 ANTIBODY   HEPATITIS C ANTIBODY          Imaging Results    None          Medications   sodium chloride 0.9% bolus 1,000 mL 1,000 mL (0 mLs Intravenous Stopped 3/29/23 1117)     Medical Decision Making:   History:   Old Medical Records: I decided to obtain old medical records.  Differential Diagnosis:   UTI  Obstructive uropathy  Nephrolithiasis   Clinical Tests:   Lab Tests: Ordered and Reviewed     APC / Resident Notes:   Patient is a 37 y.o. male who presents to the ED 03/29/2023 who underwent emergent evaluation for hematuria with intermittent pain in his bladder.  No CVA tenderness or other abdominal tenderness mild suprapubic tenderness that resolves in the ED. recent evaluation for this same with recent CT renal stone study reviewed.  I do not think repeat CT scan indicated at this time.  There was no acute findings on recent CT scan.  Patient agrees to deferment.  Labs unremarkable.  Urinalysis without evidence of infection and recent urine culture done this week with out signs of infection.  I do not think antibiotics are indicated at this time.  Case discussed with urologist Dr. Sanders who recommends Flomax and Toradol as patient is likely passing kidney stone not seen on imaging.  Patient is made aware of this.  His pain is under control and he is tolerating p.o. and very well-appearing with normal vital signs.  He is given Flomax and Toradol referred to Urology for follow-up. Based on my clinical evaluation, I do not appreciate any immediate, emergent, or life threatening condition or etiology that warrants  additional workup today and feel that the patient can be discharged with close follow up care.  Follow up and return precautions discussed; patient verbalized understanding and is agreeable to plan of care. Patient discharged home in stable condition.              Scribe Attestation:   Scribe #1: I performed the above scribed service and the documentation accurately describes the services I performed. I attest to the accuracy of the note.    Attending Attestation:           Physician Attestation for Scribe:  Physician Attestation Statement for Scribe #1: I, Lupe Song, reviewed documentation, as scribed by in my presence, and it is both accurate and complete.     Comments: I, BHUPENDRA AnthonyC, personally performed the services described in this documentation. All medical record entries made by the scribe were at my direction and in my presence.  I have reviewed the chart and agree that the record reflects my personal performance and is accurate and complete. JUSTINE Anthony.  8:01 PM 03/29/2023                       Clinical Impression:   Final diagnoses:  [R31.9] Hematuria, unspecified type (Primary)        ED Disposition Condition    Discharge Stable          ED Prescriptions       Medication Sig Dispense Start Date End Date Auth. Provider    ketorolac (TORADOL) 10 mg tablet Take 1 tablet (10 mg total) by mouth every 6 (six) hours. for 5 days 20 tablet 3/29/2023 4/3/2023 Lupe Song NP    tamsulosin (FLOMAX) 0.4 mg Cap Take 1 capsule (0.4 mg total) by mouth once daily. 10 capsule 3/29/2023 3/28/2024 Lupe Song NP          Follow-up Information       Follow up With Specialties Details Why Contact Info    Krysta Sanders MD Urology In 1 week  [address] [place] [address] [address]  [address]  [facility] [id number] [phone]      [facility] Emergency Dept Emergency Medicine  As needed, If symptoms worsen [address] [place] [address] Drive  [facility] [address] [phone]             Lupe Song NP  03/29/23  2005

## 2023-03-29 NOTE — PROGRESS NOTES
Merry Cuevas  ED Navigator  Emergency Department    Project: [facility] ED Navigator  Role: Community Health Worker    Date: 03/29/2023  Patient Name: Bonifacio Bower  MRN: [id number]  PCP: Primary Doctor No    Assessment:     Bonifacio Bower is a 37 y.o. male who has presented to ED for Abdominal Pain. Patient has visited the ED 2 times in the past 3 months. Patient did not contact PCP.     ED Navigator Initial Assessment    ED Navigator Enrollment Documentation  Consent to Services  Does patient consent to completing the assessment?: Yes  Contact  Method of Initial Contact: Face to Face  Transportation  Does the patient have issues with Transportation?: No  Does the patient have transportation to and from healthcare appointments?: Yes  Insurance Coverage  Do you have coverage/adequate coverage?: Yes  Type/kind of coverage: Medicaid/UHC  Is patient able to afford co-pays/deductibles?: Yes  Is patient able to afford HME or supplies?: Yes  Does patient have an established [facility] PCP?: No  Does patient need assistance finding a PCP?: Yes  Does the patient have a lack of adequate coverage?: No  Specialist Appointment  Did the patient come to the ED to see a specialist?: No  Does the patient have a pending specialist referral?: No  Does the patient have a specialist appointment made?: No  PCP Follow Up Appointment  Has the patient had an appointment with a primary care provider in the past year?: No  Does the patient have a follow up appontment with a PCP?: No  When was the last time you saw your PCP?: 3/29/22  Why does the patient not have a follow up scheduled?: No established [facility]/outside PCP  Would you like an [facility] PCP?: Yes  Medications  Is patient able to afford medication?: Yes  Is patient unable to get medication due to lack of transportation?: No  Psychological  Does the patient have psycho-social concerns?: No  Food  Does the patient have concerns about food?: No  Communication/Education  Does the patient have  limited English proficiency/English not primary language?: No  Does patient have low literacy and/or low health literacy?: Yes  Does patient have concerns with care?: No  Does patient have dissatisfaction with care?: No  Other Financial Concerns  Does the patient have immediate financial distress?: No  Does the patient have general financial concerns?: No  Other Social Barriers/Concerns  Does the patient have any additional barriers or concerns?: Dental, Other (see comments)  Primary Barrier  Barriers identified: Cognitive barrier (health literacy, language and communication, etc.)  Root Cause of ED Utilization: Lack of Access to Primary Care  Plan to address Lack of Access to Primary Care: Provided [facility] PCP assistance line [phone], Provided information for [facility] ([facility], [facility], etc.), Provided information for [facility] On [facility] 24/7 Nurse Triage line [phone] or 1-866-[facility] ([phone])  Next steps: Provided Education  Was education/educational materials provided surrounding PCP services/creating a medical home?: Yes Was education verbal or written?: Written     Was education/educational materials provided surrounding low cost, healthy foods?: Yes Was education verbal or written?: Written     Was education/educational materials provided surrounding other items? If so, use comment to explain.: Yes Was education verbal or written?: Written   Plan: Provided information for [facility] On [facility] 24/7 Nurse triage line, [phone] or 1-866-[facility] ([phone])  Expected Date of Follow Up 1: 4/12/23         Social History     Socioeconomic History    Marital status:    Tobacco Use    Smoking status: Never    Smokeless tobacco: Never   Substance and Sexual Activity    Alcohol use: Yes     Comment: occasionally    Drug use: No    Sexual activity: Yes     Partners: Female     Social Determinants of Health      Financial Resource Strain: Low Risk     Difficulty of Paying Living Expenses: Not hard at all   Food Insecurity: No Food Insecurity    Worried About Running Out of Food in the Last Year: Never true    Ran Out of Food in the Last Year: Never true   Transportation Needs: No Transportation Needs    Lack of Transportation (Medical): No    Lack of Transportation (Non-Medical): No   Stress: No Stress Concern Present    Feeling of Stress : Not at all   Social Connections: Unknown    Frequency of Communication with Friends and Family: Three times a week    Frequency of Social Gatherings with Friends and Family: Three times a week    Marital Status:    Housing Stability: Unknown    Unable to Pay for Housing in the Last Year: No    Unstable Housing in the Last Year: No       Plan:   Spoke with patient in the ER and completed initial enrollment.  Patient recently obtained Medicaid and does not have providers in place.  Patient reported he and and his wife have been trying to find a PCP who will accept their insurance but have not been able to.  Patient stated he also needs to find a dentist, eye doctor, and urologist.  I provided provider lists from the [facility] web site and also provided information on [facility].  Patient denied any concerns with food, housing, utilities, or transportation.  Patient stated he quit smoking three years ago and only drinks occasionally.  Patient denied any concerns with depression/anxiety.  Patient was very kind and engaged during our interaction and expressed much gratitude for the resources provided.    Merry Cuevas  ED Navigator

## 2023-03-30 ENCOUNTER — OFFICE VISIT (OUTPATIENT)
Dept: UROLOGY | Facility: CLINIC | Age: 38
End: 2023-03-30
Payer: MEDICAID

## 2023-03-30 VITALS
DIASTOLIC BLOOD PRESSURE: 83 MMHG | BODY MASS INDEX: 28.14 KG/M2 | SYSTOLIC BLOOD PRESSURE: 119 MMHG | HEART RATE: 81 BPM | WEIGHT: 190 LBS | HEIGHT: 69 IN

## 2023-03-30 DIAGNOSIS — N20.1 LEFT URETERAL STONE: ICD-10-CM

## 2023-03-30 PROCEDURE — 3074F SYST BP LT 130 MM HG: CPT | Mod: CPTII,,, | Performed by: NURSE PRACTITIONER

## 2023-03-30 PROCEDURE — 99204 PR OFFICE/OUTPT VISIT, NEW, LEVL IV, 45-59 MIN: ICD-10-PCS | Mod: S$PBB,,, | Performed by: NURSE PRACTITIONER

## 2023-03-30 PROCEDURE — 3008F BODY MASS INDEX DOCD: CPT | Mod: CPTII,,, | Performed by: NURSE PRACTITIONER

## 2023-03-30 PROCEDURE — 1160F RVW MEDS BY RX/DR IN RCRD: CPT | Mod: CPTII,,, | Performed by: NURSE PRACTITIONER

## 2023-03-30 PROCEDURE — 3079F PR MOST RECENT DIASTOLIC BLOOD PRESSURE 80-89 MM HG: ICD-10-PCS | Mod: CPTII,,, | Performed by: NURSE PRACTITIONER

## 2023-03-30 PROCEDURE — 3008F PR BODY MASS INDEX (BMI) DOCUMENTED: ICD-10-PCS | Mod: CPTII,,, | Performed by: NURSE PRACTITIONER

## 2023-03-30 PROCEDURE — 3079F DIAST BP 80-89 MM HG: CPT | Mod: CPTII,,, | Performed by: NURSE PRACTITIONER

## 2023-03-30 PROCEDURE — 99999 PR PBB SHADOW E&M-EST. PATIENT-LVL IV: CPT | Mod: PBBFAC,,, | Performed by: NURSE PRACTITIONER

## 2023-03-30 PROCEDURE — 1159F PR MEDICATION LIST DOCUMENTED IN MEDICAL RECORD: ICD-10-PCS | Mod: CPTII,,, | Performed by: NURSE PRACTITIONER

## 2023-03-30 PROCEDURE — 3074F PR MOST RECENT SYSTOLIC BLOOD PRESSURE < 130 MM HG: ICD-10-PCS | Mod: CPTII,,, | Performed by: NURSE PRACTITIONER

## 2023-03-30 PROCEDURE — 1159F MED LIST DOCD IN RCRD: CPT | Mod: CPTII,,, | Performed by: NURSE PRACTITIONER

## 2023-03-30 PROCEDURE — 99204 OFFICE O/P NEW MOD 45 MIN: CPT | Mod: S$PBB,,, | Performed by: NURSE PRACTITIONER

## 2023-03-30 PROCEDURE — 99999 PR PBB SHADOW E&M-EST. PATIENT-LVL IV: ICD-10-PCS | Mod: PBBFAC,,, | Performed by: NURSE PRACTITIONER

## 2023-03-30 PROCEDURE — 99214 OFFICE O/P EST MOD 30 MIN: CPT | Mod: PBBFAC,PN | Performed by: NURSE PRACTITIONER

## 2023-03-30 PROCEDURE — 1160F PR REVIEW ALL MEDS BY PRESCRIBER/CLIN PHARMACIST DOCUMENTED: ICD-10-PCS | Mod: CPTII,,, | Performed by: NURSE PRACTITIONER

## 2023-03-30 RX ORDER — TAMSULOSIN HYDROCHLORIDE 0.4 MG/1
0.4 CAPSULE ORAL DAILY
Qty: 30 CAPSULE | Refills: 1 | Status: SHIPPED | OUTPATIENT
Start: 2023-03-30 | End: 2023-04-11 | Stop reason: SDUPTHER

## 2023-03-30 RX ORDER — KETOROLAC TROMETHAMINE 10 MG/1
10 TABLET, FILM COATED ORAL EVERY 8 HOURS PRN
Qty: 15 TABLET | Refills: 0 | Status: SHIPPED | OUTPATIENT
Start: 2023-03-30 | End: 2023-04-04

## 2023-03-30 NOTE — PATIENT INSTRUCTIONS
Continue flomax once a day     Take toradol as needed for pain.    Drink at least 2 L of water per day    Strain every urine. If stone collected, bring to lab for analysis    If stone collected, please notify me in clinic and will cancel CT and order/schedule US    -The patient was encouraged to drink 2-3 liters of water a day, limit iced tea and tal as well as foods high in oxalate.  They were cautioned to try to limit salt and red meat intake. Low oxalate diet (limit spinach, rhubarb, nuts, beets, potatoes, chocolate).  No vitamin C supplements. We also discussed adding citrate to the diet with the addition of felicitas or lemon juice to their water or alternatively with crystal light.        Get prompt medical attention if any of the following occur:  Severe pain that returns and not relieved by pain medicines  Repeated vomiting or unable to keep down fluids  Weakness, dizziness or fainting  Fever of 100.4ºF (38ºC) or higher, or as directed by your healthcare provider  Blood clots in urine  Foul smelling or cloudy urine  Unable to pass urine for 8 hours or increasing bladder pressure

## 2023-03-30 NOTE — PROGRESS NOTES
CHIEF COMPLAINT:    Mr. Bower is a 37 y.o. male presenting for left ureteral stone.  PRESENTING ILLNESS:    Bonifacio Bower is a 37 y.o. male with a PMH of kidney stones who presents for left ureteral stone. Last clinic visit was 9/16/19 with Dr. Bennett.    History with Dr. Bennett  ER 11/18/16: SP/flank pain. 3 mm left UVJ stone without hydronephrosis. Pt's pain is being appropriately controlled. Discharge on Flomax, antiemetics, and pain meds. Provided contact for urologist.  ER 9/12/17: L flank pain x2d with dysuria, +L CVAT. CT scan shows no renal stone.  He was given Toradol with resolution of his symptoms.  Suspect that secondary to musculoskeletal cause     He presents 9/16/19 noting:  Doesn't feel like a stone per se, though has had a kidney infection before and it feels like that  4d ago woke up with chills and had 102 fever and took tylenol and since then has had urinary frequency since then.  Feels tense lower back above hips, radiating towards groin, though this is both sides.  Has had stones 4x and 2 kidney infections. Stones started around 2011. Always passed them. Never caught/strained.  Only saw urology, Dr Delong, last year for vasectomy - no pain no mention of or workup of stones  No perineal, perirectal pain. No pain with ejaculation. Has had those symptoms before and evaluated by Dr Delong and noted nothing wrong.  No dysuria.  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  No high risk sexual encounters or STD concerns  When started feeling febrile had lower back aches.  Changed diet to cut out soft drinks, limited red meat, etc and hasnt had further problems with kidneys    3/30/23  Patient presents today for f/u ED visit, left ureteral stone.  Pt went to ED 3/25/23 for abdominal pain, LLQ, and hematuria. CT RSS resulted renal stone. Patient returned to ED 3/26/23 for  continued hematuria and LLQ pain. Dr. Sanders reviewed imaging and left ureteral stone seen on CT. Pt was discharged home with flomax and toradol.  3/29/23  Micro UA RBC 19, WBC 0  Creatinine 1.1 / GFR>60  CBC WBC 7.44    Today patient reports pain has improved and last took toradol this morning. He was not provided a strainer. Denies any further episodes of gross hematuria. Denies difficulty voiding. Denies dysuria, fever, chills, nausea or vomiting. He has increased water intake to at least 2 L per day.    He reports he started juicing beets and that could have contributed to his stones.    Per Dr. Sanders:  CT read had said 2 mm stone in left kidney.  Nonobstructing.  Independent review the CTs today does show a distal left ureteral stone.  Sounds like he is passing it since he is also having bladder  Spasms now.  Asked ER to send him home on Flomax and Toradol.  And schedule follow up with us.  He can try to pass the stone for 6 weeks but if he develops pain or fevers needs to have it done sooner.  He should have a repeat CT scan in 4-6 weeks if he does not pass the stone.  Would give him a trial of passage with more Flomax and Toradol.  If he is having severe pain does not pass stone by next week taken to the OR for stone extraction next Wednesday.             REVIEW OF SYSTEMS:    Review of Systems    Constitutional: Negative for fever and chills.   HENT: Negative for hearing loss.   Eyes: Negative for visual disturbance.   Respiratory: Negative for shortness of breath.   Cardiovascular: Negative for chest pain.   Gastrointestinal: Negative for nausea, vomiting, and constipation.   Genitourinary:  See above  Neurological: Negative for dizziness.   Hematological: Does not bruise/bleed easily.   Psychiatric/Behavioral: Negative for confusion.       PATIENT HISTORY:    No past medical history on file.    Past Surgical History:   Procedure Laterality Date    FRACTURE SURGERY      right hand       No family  history on file.    Social History     Socioeconomic History    Marital status:    Tobacco Use    Smoking status: Never    Smokeless tobacco: Never   Substance and Sexual Activity    Alcohol use: Yes     Comment: occasionally    Drug use: No    Sexual activity: Yes     Partners: Female     Social Determinants of Health     Financial Resource Strain: Low Risk     Difficulty of Paying Living Expenses: Not hard at all   Food Insecurity: No Food Insecurity    Worried About Running Out of Food in the Last Year: Never true    Ran Out of Food in the Last Year: Never true   Transportation Needs: No Transportation Needs    Lack of Transportation (Medical): No    Lack of Transportation (Non-Medical): No   Stress: No Stress Concern Present    Feeling of Stress : Not at all   Social Connections: Unknown    Frequency of Communication with Friends and Family: Three times a week    Frequency of Social Gatherings with Friends and Family: Three times a week    Marital Status:    Housing Stability: Unknown    Unable to Pay for Housing in the Last Year: No    Unstable Housing in the Last Year: No       Allergies:  Patient has no known allergies.    Medications:    Current Outpatient Medications:     ketorolac (TORADOL) 10 mg tablet, Take 1 tablet (10 mg total) by mouth every 8 (eight) hours as needed for Pain., Disp: 15 tablet, Rfl: 0    tamsulosin (FLOMAX) 0.4 mg Cap, Take 1 capsule (0.4 mg total) by mouth once daily., Disp: 30 capsule, Rfl: 1    PHYSICAL EXAMINATION:    Constitutional: He is oriented to person, place, and time. He appears well-developed and well-nourished.  He is in no apparent distress.    Neck: Normal ROM.     Cardiovascular: Normal rate.      Pulmonary/Chest: Effort normal. No respiratory distress.     Abdominal:  He exhibits no distension.  There is no CVA tenderness.     Neurological: He is alert and oriented to person, place, and time.     Skin: Skin is warm and dry.     Psych: Cooperative with  normal affect.      Physical Exam      LABS:    No results found for: PSA, PSADIAG, PSATOTAL, PSAFREE, PSAFREEPCT  Lab Results   Component Value Date    CREATININE 1.1 03/29/2023         IMPRESSION:    Encounter Diagnoses   Name Primary?    Left ureteral stone          PLAN:  -Will proceed with trial of passage for stone:  Drink at least 2 liters of water daily  Continue flomax, refilled flomax  Continue toradol as needed for pain, refilled toradol  Strain every urine.  Strainer provided.    Patient instructed to bring to lab stone for stone analysis, order placed.  Sterile cup provided.   Will plan to repeat CT RSS in 4-6 weeks if unable to pass stone.    If pain worsens or develops fever, may need stone extraction in OR.    -General risk factors for kidney stones and the conservative measures to prevent kidney stones in the future were discussed with the patient in detail.  The patient was encouraged to drink 2-3 liters of water a day, limit iced tea and tal as well as foods high in oxalate.  They were cautioned to try to limit salt and red meat intake. Low oxalate diet (limit spinach, rhubarb, nuts, beets, potatoes, chocolate).  No vitamin C supplements. We also discussed adding citrate to the diet with the addition of felicitas or lemon juice to their water or alternatively with crystal light.      Get prompt medical attention if any of the following occur:  Severe pain that returns and not relieved by pain medicines  Repeated vomiting or unable to keep down fluids  Weakness, dizziness or fainting  Fever of 100.4ºF (38ºC) or higher, or as directed by your healthcare provider  Blood clots in urine  Foul smelling or cloudy urine  Unable to pass urine for 8 hours or increasing bladder pressure     -RTC based on repeat CT results     I encouraged him or any of his family members to call or email me with questions and/or concerns.      45 minutes of total time spent on the encounter, which includes face to face time  and non-face to face time preparing to see the patient (eg, review of tests), Obtaining and/or reviewing separately obtained history, Documenting clinical information in the electronic or other health record, Independently interpreting results (not separately reported) and communicating results to the patient/family/caregiver, or Care coordination (not separately reported).

## 2023-03-30 NOTE — TELEPHONE ENCOUNTER
Called by ER for patient who came in twice complaining of hematuria and left flank pain.  Had a CT scan on March 25th.    CT read had said 2 mm stone in left kidney.  Nonobstructing.  Independent review the CTs today does show a distal left ureteral stone.  Sounds like he is passing it since he is also having bladder  Spasms now.  Asked ER to send him home on Flomax and Toradol.  And schedule follow up with us.  He can try to pass the stone for 6 weeks but if he develops pain or fevers needs to have it done sooner.  He should have a repeat CT scan in 4-6 weeks if he does not pass the stone.  Would give him a trial of passage with more Flomax and Toradol.  If he is having severe pain does not pass stone by next week taken to the OR for stone extraction next Wednesday.

## 2023-04-28 ENCOUNTER — TELEPHONE (OUTPATIENT)
Dept: UROLOGY | Facility: CLINIC | Age: 38
End: 2023-04-28

## 2023-04-28 ENCOUNTER — CLINICAL SUPPORT (OUTPATIENT)
Dept: UROLOGY | Facility: CLINIC | Age: 38
End: 2023-04-28
Payer: MEDICAID

## 2023-04-28 DIAGNOSIS — N20.1 LEFT URETERAL STONE: Primary | ICD-10-CM

## 2023-04-28 DIAGNOSIS — N20.1 URETERAL STONE: Primary | ICD-10-CM

## 2023-04-28 PROCEDURE — 99499 NO LOS: ICD-10-PCS | Mod: S$PBB,,, | Performed by: NURSE PRACTITIONER

## 2023-04-28 PROCEDURE — 82365 CALCULUS SPECTROSCOPY: CPT | Performed by: NURSE PRACTITIONER

## 2023-04-28 PROCEDURE — 99499 UNLISTED E&M SERVICE: CPT | Mod: S$PBB,,, | Performed by: NURSE PRACTITIONER

## 2023-04-28 NOTE — PROGRESS NOTES
Patient arrived in clinic to drop off stone. Specimen prepared for lab . Patient spoke with NP about future testing.

## 2023-04-28 NOTE — TELEPHONE ENCOUNTER
----- Message from Andrews Seo sent at 4/28/2023 10:08 AM CDT -----  Regarding: Return Call  Contact: Patient  Type:  Patient Returning Call    Who Called:Patient  Who Left Message for Patient:office staff please call patient  Does the patient know what this is regarding?:Sample of stones  Would the patient rather a call back or a response via [facility]? Where do patient drop off?  Best Call Back Number:[phone]  Additional Information: Please call patient to advise.

## 2023-04-28 NOTE — PROGRESS NOTES
Pt passed stone.  CT RSS cancelled.  JACKIE and KUB in 3 months  Orders placed. Staff to schedule

## 2023-05-06 LAB
COMPN STONE: NORMAL
SPECIMEN SOURCE: NORMAL
STONE ANALYSIS IR-IMP: NORMAL

## 2023-05-19 ENCOUNTER — HOSPITAL ENCOUNTER (OUTPATIENT)
Dept: RADIOLOGY | Facility: HOSPITAL | Age: 38
Discharge: HOME | End: 2023-05-19
Attending: NURSE PRACTITIONER
Payer: MEDICAID

## 2023-05-19 DIAGNOSIS — N20.1 URETERAL STONE: ICD-10-CM

## 2023-05-19 PROCEDURE — 76770 US RETROPERITONEAL COMPLETE: ICD-10-PCS | Mod: 26,,, | Performed by: RADIOLOGY

## 2023-05-19 PROCEDURE — 76770 US EXAM ABDO BACK WALL COMP: CPT | Mod: 26,,, | Performed by: RADIOLOGY

## 2023-05-19 PROCEDURE — 74018 XR KUB: ICD-10-PCS | Mod: 26,,, | Performed by: RADIOLOGY

## 2023-05-19 PROCEDURE — 76770 US EXAM ABDO BACK WALL COMP: CPT | Mod: TC

## 2023-05-19 PROCEDURE — 74018 RADEX ABDOMEN 1 VIEW: CPT | Mod: TC,FY

## 2023-05-19 PROCEDURE — 74018 RADEX ABDOMEN 1 VIEW: CPT | Mod: 26,,, | Performed by: RADIOLOGY

## 2023-07-05 ENCOUNTER — TELEPHONE (OUTPATIENT)
Dept: OTOLARYNGOLOGY | Facility: CLINIC | Age: 38
End: 2023-07-05
Payer: MEDICAID

## 2023-07-05 ENCOUNTER — HOSPITAL ENCOUNTER (EMERGENCY)
Facility: HOSPITAL | Age: 38
Discharge: HOME | End: 2023-07-05
Attending: EMERGENCY MEDICINE
Payer: MEDICAID

## 2023-07-05 VITALS
OXYGEN SATURATION: 98 % | HEIGHT: 69 IN | DIASTOLIC BLOOD PRESSURE: 86 MMHG | HEART RATE: 81 BPM | RESPIRATION RATE: 17 BRPM | SYSTOLIC BLOOD PRESSURE: 130 MMHG | BODY MASS INDEX: 25.77 KG/M2 | WEIGHT: 174 LBS | TEMPERATURE: 98 F

## 2023-07-05 DIAGNOSIS — K21.9 GASTROESOPHAGEAL REFLUX DISEASE WITHOUT ESOPHAGITIS: ICD-10-CM

## 2023-07-05 DIAGNOSIS — J39.2 PHARYNGEAL IRRITATION: Primary | ICD-10-CM

## 2023-07-05 LAB
GROUP A STREP, MOLECULAR: NEGATIVE
INFLUENZA A, MOLECULAR: NEGATIVE
INFLUENZA B, MOLECULAR: NEGATIVE
SARS-COV-2 RDRP RESP QL NAA+PROBE: NEGATIVE
SPECIMEN SOURCE: NORMAL

## 2023-07-05 PROCEDURE — 25000003 PHARM REV CODE 250: Performed by: EMERGENCY MEDICINE

## 2023-07-05 PROCEDURE — 87651 STREP A DNA AMP PROBE: CPT | Performed by: EMERGENCY MEDICINE

## 2023-07-05 PROCEDURE — 87502 INFLUENZA DNA AMP PROBE: CPT | Performed by: EMERGENCY MEDICINE

## 2023-07-05 PROCEDURE — 99283 EMERGENCY DEPT VISIT LOW MDM: CPT

## 2023-07-05 PROCEDURE — U0002 COVID-19 LAB TEST NON-CDC: HCPCS | Performed by: EMERGENCY MEDICINE

## 2023-07-05 RX ORDER — LIDOCAINE HYDROCHLORIDE 20 MG/ML
10 SOLUTION OROPHARYNGEAL
Status: COMPLETED | OUTPATIENT
Start: 2023-07-05 | End: 2023-07-05

## 2023-07-05 RX ORDER — MAG HYDROX/ALUMINUM HYD/SIMETH 200-200-20
30 SUSPENSION, ORAL (FINAL DOSE FORM) ORAL
Status: COMPLETED | OUTPATIENT
Start: 2023-07-05 | End: 2023-07-05

## 2023-07-05 RX ORDER — OMEPRAZOLE 20 MG/1
20 CAPSULE, DELAYED RELEASE ORAL DAILY
Qty: 14 CAPSULE | Refills: 0 | Status: SHIPPED | OUTPATIENT
Start: 2023-07-05 | End: 2023-12-21

## 2023-07-05 RX ADMIN — LIDOCAINE HYDROCHLORIDE 10 ML: 20 SOLUTION ORAL at 05:07

## 2023-07-05 RX ADMIN — ALUMINUM HYDROXIDE, MAGNESIUM HYDROXIDE, AND SIMETHICONE 30 ML: 200; 200; 20 SUSPENSION ORAL at 05:07

## 2023-07-05 NOTE — ED PROVIDER NOTES
Encounter Date: 7/5/2023       History     Chief Complaint   Patient presents with    Sore Throat     Starting yesterday     Patient is a 38-year-old male who presents the emergency room for evaluation of a sore throat this started yesterday.  The patient was barbecuing and had a few beers.  He turned his head to the right and drank beer and felt irritation to the right side of his pharynx.  He also took 2 shots of Tequila and smoked a hookah.  He may have had some reflux the other day after eating meat.  He had frequent belching and burping today.  He denies any runny nose fevers cough shortness breath abdominal pain chest pain rash or any other complaints    Review of patient's allergies indicates:  No Known Allergies  History reviewed. No pertinent past medical history.  Past Surgical History:   Procedure Laterality Date    FRACTURE SURGERY      right hand     History reviewed. No pertinent family history.  Social History     Tobacco Use    Smoking status: Never    Smokeless tobacco: Never   Substance Use Topics    Alcohol use: Yes     Comment: occasionally    Drug use: No     Review of Systems   Constitutional:  Negative for fever.   HENT:  Positive for sore throat and trouble swallowing. Negative for congestion, postnasal drip, rhinorrhea, sinus pain and voice change.    Respiratory:  Negative for cough and shortness of breath.    Cardiovascular:  Negative for chest pain.   Gastrointestinal:  Negative for abdominal pain, nausea and vomiting.        Frequent belching and burping   Neurological:  Negative for weakness.     Physical Exam     Initial Vitals [07/05/23 0430]   BP Pulse Resp Temp SpO2   130/86 81 17 98.4 °F (36.9 °C) 98 %      MAP       --         Physical Exam    Nursing note and vitals reviewed.  Constitutional: He appears well-developed and well-nourished.   HENT:   Head: Normocephalic and atraumatic.   Erythematous posterior oropharynx with no hoarseness or stridor.  No significant cervical  lymphadenopathy   Eyes: Conjunctivae and EOM are normal. Pupils are equal, round, and reactive to light.   Neck: Neck supple.   Pulmonary/Chest: Breath sounds normal. He has no wheezes. He has no rhonchi. He has no rales.   Abdominal: Abdomen is soft.   Musculoskeletal:      Cervical back: Neck supple.     Neurological: He is alert and oriented to person, place, and time.   Psychiatric: He has a normal mood and affect.       ED Course   Procedures  Labs Reviewed   GROUP A STREP, MOLECULAR   INFLUENZA A & B BY MOLECULAR   SARS-COV-2 RNA AMPLIFICATION, QUAL          Imaging Results    None          Medications   aluminum-magnesium hydroxide-simethicone 200-200-20 mg/5 mL suspension 30 mL (30 mLs Oral Given 7/5/23 0515)     And   LIDOcaine HCl 2% oral solution 10 mL (10 mLs Oral Given 7/5/23 0515)     Medical Decision Making:   Believe the patient had reflux with pharyngeal irritation.  No hoarseness or stridor.  No masses on exam.  Can take Prilosec.  He feels better after GI cocktail.  He is stable for discharge at this time.  I do not believe this is a retropharyngeal abscess peritonsillar abscess acute coronary syndrome cardiopulmonary in nature.  If it is persistent he may need to follow up with ENT for further evaluation for pharyngoscopy to rule out other etiology such as mass or cyst..  Stable for discharge.                        Clinical Impression:   Final diagnoses:  [J39.2] Pharyngeal irritation (Primary)  [K21.9] Gastroesophageal reflux disease without esophagitis        ED Disposition Condition    Discharge Stable          ED Prescriptions       Medication Sig Dispense Start Date End Date Auth. Provider    omeprazole (PRILOSEC) 20 MG capsule Take 1 capsule (20 mg total) by mouth once daily. 14 capsule 7/5/2023 7/4/2024 Abhilash Veronica MD          Follow-up Information       Follow up With Specialties Details Why Contact Info    Ghulam Gomez MD Otolaryngology Schedule an appointment as soon as  possible for a visit   [address] [facility] [address]  [phone]               Abhilash Veronica MD  07/05/23 0548

## 2023-07-05 NOTE — TELEPHONE ENCOUNTER
"MD Brittany Childress LPN 21 minutes ago (4:42 PM)     TS  Routine seems appropriate.  Per ER note " If it is persistent he may need to follow up with ENT for further evaluation for pharyngoscopy to rule out other etiology such as mass or cyst..  "      ALBARO Gastelum MD 5 hours ago (11:34 AM)     HM  Okay to schedule ED f/u? Please review and advise.      "

## 2023-07-05 NOTE — TELEPHONE ENCOUNTER
----- Message from Fiorella Taylor sent at 7/5/2023  7:52 AM CDT -----  Regarding: Appt  Contact: Pt  Type:  Sooner Appointment Request    Caller is requesting a sooner appointment.  Caller declined first available appointment listed below.  Caller will not accept being placed on the waitlist and is requesting a message be sent to doctor.    Name of Caller:  Pt  When is the first available appointment?    Symptoms:  ER follow up throat issues  Best Call Back Number:  [phone]    Additional Information:  Pt states that he needs an ER follow-up for his throat. He states his throat is very red, inflamed, and if something is stuck on the right side of throat. Please call patient to schedule. Thanks!

## 2023-07-05 NOTE — Clinical Note
"Bonifacio"Lucinda Bower was seen and treated in our emergency department on 7/5/2023.  He may return to work on 07/06/2023.       If you have any questions or concerns, please don't hesitate to call.      Abhilash Veronica MD"

## 2023-07-05 NOTE — DISCHARGE INSTRUCTIONS
I suspect you had reflux burning the back of your throat causing her symptoms.  I doubt this is a cyst or mass.  It should get better with medicine prescribed, time, and drinking non ascitic noncarbonated foods.  If it is persistent you need to follow-up with an ENT physician.  Call the doctor about to schedule follow-up appointment as needed

## 2023-07-07 NOTE — TELEPHONE ENCOUNTER
Called pt and scheduled next available new pt appt. Pt states that symptoms have resolved this time.  Thanks, Cesilia

## 2023-12-19 ENCOUNTER — PATIENT MESSAGE (OUTPATIENT)
Dept: UROLOGY | Facility: CLINIC | Age: 38
End: 2023-12-19
Payer: MEDICAID

## 2023-12-19 ENCOUNTER — TELEPHONE (OUTPATIENT)
Dept: UROLOGY | Facility: CLINIC | Age: 38
End: 2023-12-19
Payer: MEDICAID

## 2023-12-19 NOTE — TELEPHONE ENCOUNTER
----- Message from La Gee sent at 12/19/2023  9:43 AM CST -----  Type:  Needs appointment request     Who Called: pt   Symptoms (please be specific): Urine screen. Feeling nervous about urine seeming foamy.    Would the patient rather a call back or a response via [facility]? call  Best Call Back Number:  [phone]  Additional Information: pt is requesting to be seen Preferred Date Range: 12/26/2023 - 1/1/2024

## 2023-12-19 NOTE — TELEPHONE ENCOUNTER
Spoke with patient. Offered tomorrow 12/20/23 at 0930 - patient declined stating he is a , and would like the latest appointment available, appointment for Thursday 12/21/23 at 1530 made. All parties v/u with no further questions or concerns voiced at this time.

## 2023-12-21 ENCOUNTER — OFFICE VISIT (OUTPATIENT)
Dept: UROLOGY | Facility: CLINIC | Age: 38
End: 2023-12-21
Payer: MEDICAID

## 2023-12-21 VITALS — HEIGHT: 69 IN | WEIGHT: 163 LBS | RESPIRATION RATE: 15 BRPM | BODY MASS INDEX: 24.14 KG/M2

## 2023-12-21 DIAGNOSIS — N20.0 KIDNEY STONES: Primary | ICD-10-CM

## 2023-12-21 LAB
BILIRUBIN, UA POC OHS: NEGATIVE
BLOOD, UA POC OHS: NEGATIVE
CLARITY, UA POC OHS: CLEAR
COLOR, UA POC OHS: YELLOW
GLUCOSE, UA POC OHS: NEGATIVE
KETONES, UA POC OHS: NEGATIVE
LEUKOCYTES, UA POC OHS: NEGATIVE
NITRITE, UA POC OHS: NEGATIVE
PH, UA POC OHS: 6.5
PROTEIN, UA POC OHS: NEGATIVE
SPECIFIC GRAVITY, UA POC OHS: 1.02
UROBILINOGEN, UA POC OHS: 0.2

## 2023-12-21 PROCEDURE — 99214 PR OFFICE/OUTPT VISIT, EST, LEVL IV, 30-39 MIN: ICD-10-PCS | Mod: S$PBB,,, | Performed by: NURSE PRACTITIONER

## 2023-12-21 PROCEDURE — 99999PBSHW POCT URINALYSIS(INSTRUMENT): Mod: PBBFAC,,,

## 2023-12-21 PROCEDURE — 1159F MED LIST DOCD IN RCRD: CPT | Mod: CPTII,,, | Performed by: NURSE PRACTITIONER

## 2023-12-21 PROCEDURE — 99213 OFFICE O/P EST LOW 20 MIN: CPT | Mod: PBBFAC,PO | Performed by: NURSE PRACTITIONER

## 2023-12-21 PROCEDURE — 99999PBSHW POCT URINALYSIS(INSTRUMENT): ICD-10-PCS | Mod: PBBFAC,,,

## 2023-12-21 PROCEDURE — 3008F PR BODY MASS INDEX (BMI) DOCUMENTED: ICD-10-PCS | Mod: CPTII,,, | Performed by: NURSE PRACTITIONER

## 2023-12-21 PROCEDURE — 99214 OFFICE O/P EST MOD 30 MIN: CPT | Mod: S$PBB,,, | Performed by: NURSE PRACTITIONER

## 2023-12-21 PROCEDURE — 81003 URINALYSIS AUTO W/O SCOPE: CPT | Mod: PBBFAC,PO | Performed by: NURSE PRACTITIONER

## 2023-12-21 PROCEDURE — 99999 PR PBB SHADOW E&M-EST. PATIENT-LVL III: CPT | Mod: PBBFAC,,, | Performed by: NURSE PRACTITIONER

## 2023-12-21 PROCEDURE — 3008F BODY MASS INDEX DOCD: CPT | Mod: CPTII,,, | Performed by: NURSE PRACTITIONER

## 2023-12-21 PROCEDURE — 99999 PR PBB SHADOW E&M-EST. PATIENT-LVL III: ICD-10-PCS | Mod: PBBFAC,,, | Performed by: NURSE PRACTITIONER

## 2023-12-21 PROCEDURE — 1160F PR REVIEW ALL MEDS BY PRESCRIBER/CLIN PHARMACIST DOCUMENTED: ICD-10-PCS | Mod: CPTII,,, | Performed by: NURSE PRACTITIONER

## 2023-12-21 PROCEDURE — 1160F RVW MEDS BY RX/DR IN RCRD: CPT | Mod: CPTII,,, | Performed by: NURSE PRACTITIONER

## 2023-12-21 PROCEDURE — 1159F PR MEDICATION LIST DOCUMENTED IN MEDICAL RECORD: ICD-10-PCS | Mod: CPTII,,, | Performed by: NURSE PRACTITIONER

## 2023-12-21 NOTE — PATIENT INSTRUCTIONS
-The patient was encouraged to drink 2-3 liters of water a day, limit iced tea and tal as well as foods high in oxalate.  They were cautioned to try to limit salt and red meat intake. Low oxalate diet (limit spinach, rhubarb, nuts, beets, potatoes, chocolate).  No vitamin C supplements. We also discussed adding citrate to the diet with the addition of felicitas or lemon juice to their water or alternatively with crystal light.

## 2023-12-21 NOTE — PROGRESS NOTES
CHIEF COMPLAINT:    Mr. Bower is a 38 y.o. male presenting for f/u kidney stone.  PRESENTING ILLNESS:    Bonifacio Bower is a 38 y.o. male with a PMH of kidney stones who presents for f/u kidney stone. Last clinic visit was 3/30/23    History with Dr. Bennett  ER 11/18/16: SP/flank pain. 3 mm left UVJ stone without hydronephrosis. Pt's pain is being appropriately controlled. Discharge on Flomax, antiemetics, and pain meds. Provided contact for urologist.  ER 9/12/17: L flank pain x2d with dysuria, +L CVAT. CT scan shows no renal stone.  He was given Toradol with resolution of his symptoms.  Suspect that secondary to musculoskeletal cause     He presents 9/16/19 noting:  Doesn't feel like a stone per se, though has had a kidney infection before and it feels like that  4d ago woke up with chills and had 102 fever and took tylenol and since then has had urinary frequency since then.  Feels tense lower back above hips, radiating towards groin, though this is both sides.  Has had stones 4x and 2 kidney infections. Stones started around 2011. Always passed them. Never caught/strained.  Only saw urology, Dr Delong, last year for vasectomy - no pain no mention of or workup of stones  No perineal, perirectal pain. No pain with ejaculation. Has had those symptoms before and evaluated by Dr Delong and noted nothing wrong.  No dysuria.  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  No high risk sexual encounters or STD concerns  When started feeling febrile had lower back aches.  Changed diet to cut out soft drinks, limited red meat, etc and hasnt had further problems with kidneys    3/30/23  Patient presents today for f/u ED visit, left ureteral stone.  Pt went to ED 3/25/23 for abdominal pain, LLQ, and hematuria. CT RSS resulted renal stone. Patient returned to ED 3/26/23 for continued hematuria and LLQ  "pain. Dr. Sanders reviewed imaging and left ureteral stone seen on CT. Pt was discharged home with flomax and toradol.  3/29/23  Micro UA RBC 19, WBC 0  Creatinine 1.1 / GFR>60  CBC WBC 7.44    Today patient reports pain has improved and last took toradol this morning. He was not provided a strainer. Denies any further episodes of gross hematuria. Denies difficulty voiding. Denies dysuria, fever, chills, nausea or vomiting. He has increased water intake to at least 2 L per day.    He reports he started juicing beets and that could have contributed to his stones.    Per Dr. Sanders:  CT read had said 2 mm stone in left kidney.  Nonobstructing.  Independent review the CTs today does show a distal left ureteral stone.  Sounds like he is passing it since he is also having bladder  Spasms now.  Asked ER to send him home on Flomax and Toradol.  And schedule follow up with us.  He can try to pass the stone for 6 weeks but if he develops pain or fevers needs to have it done sooner.  He should have a repeat CT scan in 4-6 weeks if he does not pass the stone.  Would give him a trial of passage with more Flomax and Toradol.  If he is having severe pain does not pass stone by next week taken to the OR for stone extraction next Wednesday.           Interval history 12/21/23  Pt presents today for f/u kidney stone  He reports his urine is "bubbly/frothy" at times, concerned for protein in urine.   Denies dysuria, gross hematuria, flank pain, fever, chill, nausea or vomiting  Good urinary stream  He has occasional frequency but drinks lots of water and also dandelion tea that has diuretic in it.    Water intake: 4-6 16oz bottles water per day  Following low oxalate diet.     UA negative    Pt did pass ureteral stone after last visit.  Repeat imaging 5/19/23 resulted simple left renal cyst  Urinary stone analysis 4/28/23: 90% Calcium oxalate dihydrate.  10% Calcium phosphate     REVIEW OF SYSTEMS:    Review of " "Systems    Constitutional: Negative for fever and chills.   Gastrointestinal: Negative for nausea, vomiting  Genitourinary:  See above  Neurological: Negative for dizziness.   Psychiatric/Behavioral: Negative for confusion.       PATIENT HISTORY:    History reviewed. No pertinent past medical history.    Past Surgical History:   Procedure Laterality Date    FRACTURE SURGERY      right hand       PHYSICAL EXAMINATION:    Constitutional: He is oriented to person, place, and time. He appears well-developed and well-nourished.  He is in no apparent distress.    Abdominal:  He exhibits no distension.  There is no CVA tenderness.     Neurological: He is alert and oriented to person, place, and time.     Psych: Cooperative with normal affect.      Physical Exam      LABS:    No results found for: "PSA", "PSADIAG", "PSATOTAL", "PSAFREE", "PSAFREEPCT"  Lab Results   Component Value Date    CREATININE 1.1 03/29/2023         IMPRESSION:    Encounter Diagnoses   Name Primary?    Kidney stones Yes     PLAN:  -UA today negative for protein. Recommend to follow up with PCP or nephrologist for further urine testing for protein in urine.  -JACKIE ordered and scheduled. Will call with results  -General risk factors for kidney stones and the conservative measures to prevent kidney stones in the future were discussed with the patient in detail.  The patient was encouraged to drink 2-3 liters of water a day, limit iced tea and tal as well as foods high in oxalate.  They were cautioned to try to limit salt and red meat intake. Low oxalate diet (limit spinach, rhubarb, nuts, beets, potatoes, chocolate).  No vitamin C supplements. We also discussed adding citrate to the diet with the addition of felicitas or lemon juice to their water or alternatively with crystal light.    -If JACKIE normal, RTC 1 year    I encouraged him or any of his family members to call or email me with questions and/or concerns.      30 minutes of total time spent on the " encounter, which includes face to face time and non-face to face time preparing to see the patient (eg, review of tests), Obtaining and/or reviewing separately obtained history, Documenting clinical information in the electronic or other health record, Independently interpreting results (not separately reported) and communicating results to the patient/family/caregiver, or Care coordination (not separately reported).

## 2023-12-26 ENCOUNTER — HOSPITAL ENCOUNTER (OUTPATIENT)
Dept: RADIOLOGY | Facility: HOSPITAL | Age: 38
Discharge: HOME | End: 2023-12-26
Attending: NURSE PRACTITIONER
Payer: MEDICAID

## 2023-12-26 DIAGNOSIS — N20.0 KIDNEY STONES: ICD-10-CM

## 2023-12-26 PROCEDURE — 76770 US EXAM ABDO BACK WALL COMP: CPT | Mod: TC

## 2023-12-26 PROCEDURE — 76770 US EXAM ABDO BACK WALL COMP: CPT | Mod: 26,,, | Performed by: RADIOLOGY

## 2023-12-26 PROCEDURE — 76770 US RETROPERITONEAL COMPLETE: ICD-10-PCS | Mod: 26,,, | Performed by: RADIOLOGY

## 2023-12-27 ENCOUNTER — TELEPHONE (OUTPATIENT)
Dept: UROLOGY | Facility: CLINIC | Age: 38
End: 2023-12-27
Payer: MEDICAID

## 2024-05-04 ENCOUNTER — HOSPITAL ENCOUNTER (EMERGENCY)
Facility: HOSPITAL | Age: 39
Discharge: HOME | End: 2024-05-04
Attending: EMERGENCY MEDICINE
Payer: MEDICAID

## 2024-05-04 VITALS
OXYGEN SATURATION: 99 % | SYSTOLIC BLOOD PRESSURE: 123 MMHG | TEMPERATURE: 98 F | DIASTOLIC BLOOD PRESSURE: 70 MMHG | HEART RATE: 68 BPM | HEIGHT: 69 IN | WEIGHT: 163 LBS | RESPIRATION RATE: 18 BRPM | BODY MASS INDEX: 24.14 KG/M2

## 2024-05-04 DIAGNOSIS — N28.1 RENAL CYST, LEFT: ICD-10-CM

## 2024-05-04 DIAGNOSIS — R10.9 RIGHT FLANK PAIN: Primary | ICD-10-CM

## 2024-05-04 LAB
ALBUMIN SERPL BCP-MCNC: 4.3 G/DL (ref 3.5–5.2)
ALP SERPL-CCNC: 60 U/L (ref 55–135)
ALT SERPL W/O P-5'-P-CCNC: 16 U/L (ref 10–44)
ANION GAP SERPL CALC-SCNC: 12 MMOL/L (ref 8–16)
AST SERPL-CCNC: 20 U/L (ref 10–40)
BACTERIA #/AREA URNS HPF: NORMAL /HPF
BASOPHILS # BLD AUTO: 0.06 K/UL (ref 0–0.2)
BASOPHILS NFR BLD: 0.7 % (ref 0–1.9)
BILIRUB SERPL-MCNC: 1.5 MG/DL (ref 0.1–1)
BILIRUB UR QL STRIP: NEGATIVE
BUN SERPL-MCNC: 14 MG/DL (ref 6–20)
CALCIUM SERPL-MCNC: 9.3 MG/DL (ref 8.7–10.5)
CHLORIDE SERPL-SCNC: 105 MMOL/L (ref 95–110)
CLARITY UR: CLEAR
CO2 SERPL-SCNC: 22 MMOL/L (ref 23–29)
COLOR UR: YELLOW
CREAT SERPL-MCNC: 1.2 MG/DL (ref 0.5–1.4)
DIFFERENTIAL METHOD BLD: ABNORMAL
EOSINOPHIL # BLD AUTO: 0.2 K/UL (ref 0–0.5)
EOSINOPHIL NFR BLD: 2.3 % (ref 0–8)
ERYTHROCYTE [DISTWIDTH] IN BLOOD BY AUTOMATED COUNT: 12.2 % (ref 11.5–14.5)
EST. GFR  (NO RACE VARIABLE): >60 ML/MIN/1.73 M^2
GLUCOSE SERPL-MCNC: 102 MG/DL (ref 70–110)
GLUCOSE UR QL STRIP: NEGATIVE
HCT VFR BLD AUTO: 41.2 % (ref 40–54)
HGB BLD-MCNC: 14.6 G/DL (ref 14–18)
HGB UR QL STRIP: NEGATIVE
IMM GRANULOCYTES # BLD AUTO: 0.02 K/UL (ref 0–0.04)
IMM GRANULOCYTES NFR BLD AUTO: 0.2 % (ref 0–0.5)
KETONES UR QL STRIP: NEGATIVE
LEUKOCYTE ESTERASE UR QL STRIP: ABNORMAL
LIPASE SERPL-CCNC: 43 U/L (ref 4–60)
LYMPHOCYTES # BLD AUTO: 2.1 K/UL (ref 1–4.8)
LYMPHOCYTES NFR BLD: 22.3 % (ref 18–48)
MCH RBC QN AUTO: 31.3 PG (ref 27–31)
MCHC RBC AUTO-ENTMCNC: 35.4 G/DL (ref 32–36)
MCV RBC AUTO: 88 FL (ref 82–98)
MICROSCOPIC COMMENT: NORMAL
MONOCYTES # BLD AUTO: 0.5 K/UL (ref 0.3–1)
MONOCYTES NFR BLD: 5.8 % (ref 4–15)
NEUTROPHILS # BLD AUTO: 6.3 K/UL (ref 1.8–7.7)
NEUTROPHILS NFR BLD: 68.7 % (ref 38–73)
NITRITE UR QL STRIP: NEGATIVE
NRBC BLD-RTO: 0 /100 WBC
PH UR STRIP: 6 [PH] (ref 5–8)
PLATELET # BLD AUTO: 264 K/UL (ref 150–450)
PMV BLD AUTO: 10.1 FL (ref 9.2–12.9)
POTASSIUM SERPL-SCNC: 3.7 MMOL/L (ref 3.5–5.1)
PROT SERPL-MCNC: 7.5 G/DL (ref 6–8.4)
PROT UR QL STRIP: ABNORMAL
RBC # BLD AUTO: 4.66 M/UL (ref 4.6–6.2)
RBC #/AREA URNS HPF: 1 /HPF (ref 0–4)
SODIUM SERPL-SCNC: 139 MMOL/L (ref 136–145)
SP GR UR STRIP: >1.03 (ref 1–1.03)
SQUAMOUS #/AREA URNS HPF: 0 /HPF
URN SPEC COLLECT METH UR: ABNORMAL
UROBILINOGEN UR STRIP-ACNC: NEGATIVE EU/DL
WBC # BLD AUTO: 9.2 K/UL (ref 3.9–12.7)
WBC #/AREA URNS HPF: 1 /HPF (ref 0–5)

## 2024-05-04 PROCEDURE — 25000003 PHARM REV CODE 250: Performed by: PHYSICIAN ASSISTANT

## 2024-05-04 PROCEDURE — 63600175 PHARM REV CODE 636 W HCPCS: Performed by: PHYSICIAN ASSISTANT

## 2024-05-04 PROCEDURE — 85025 COMPLETE CBC W/AUTO DIFF WBC: CPT | Performed by: PHYSICIAN ASSISTANT

## 2024-05-04 PROCEDURE — 99285 EMERGENCY DEPT VISIT HI MDM: CPT | Mod: 25

## 2024-05-04 PROCEDURE — 96361 HYDRATE IV INFUSION ADD-ON: CPT

## 2024-05-04 PROCEDURE — 36415 COLL VENOUS BLD VENIPUNCTURE: CPT | Performed by: PHYSICIAN ASSISTANT

## 2024-05-04 PROCEDURE — 80053 COMPREHEN METABOLIC PANEL: CPT | Performed by: PHYSICIAN ASSISTANT

## 2024-05-04 PROCEDURE — 81000 URINALYSIS NONAUTO W/SCOPE: CPT | Performed by: PHYSICIAN ASSISTANT

## 2024-05-04 PROCEDURE — 83690 ASSAY OF LIPASE: CPT | Performed by: PHYSICIAN ASSISTANT

## 2024-05-04 PROCEDURE — 96374 THER/PROPH/DIAG INJ IV PUSH: CPT

## 2024-05-04 RX ORDER — CYCLOBENZAPRINE HCL 10 MG
10 TABLET ORAL 3 TIMES DAILY PRN
Qty: 21 TABLET | Refills: 0 | Status: SHIPPED | OUTPATIENT
Start: 2024-05-04 | End: 2024-05-11

## 2024-05-04 RX ORDER — KETOROLAC TROMETHAMINE 30 MG/ML
15 INJECTION, SOLUTION INTRAMUSCULAR; INTRAVENOUS
Status: COMPLETED | OUTPATIENT
Start: 2024-05-04 | End: 2024-05-04

## 2024-05-04 RX ORDER — LIDOCAINE 50 MG/G
1 PATCH TOPICAL DAILY
Qty: 9 PATCH | Refills: 0 | Status: SHIPPED | OUTPATIENT
Start: 2024-05-04 | End: 2024-05-13

## 2024-05-04 RX ADMIN — KETOROLAC TROMETHAMINE 15 MG: 30 INJECTION, SOLUTION INTRAMUSCULAR at 04:05

## 2024-05-04 RX ADMIN — SODIUM CHLORIDE 1000 ML: 9 INJECTION, SOLUTION INTRAVENOUS at 04:05

## 2024-05-04 NOTE — ED PROVIDER NOTES
Encounter Date: 5/4/2024       History     Chief Complaint   Patient presents with    Flank Pain     Rt. Side  / pain started this am      Patient is a 39-year-old male who presents with right flank pain that started few hours prior to arrival.  He reports past medical history significant for renal stones.  He denies any nausea or vomiting.  Denies urinary symptoms.  He reports pain is worse with movement.  He took Robaxin and naproxen this morning.  He states the medication made him sleepy but did not significantly improve the pain.    The history is provided by the patient.     Review of patient's allergies indicates:  No Known Allergies  No past medical history on file.  Past Surgical History:   Procedure Laterality Date    FRACTURE SURGERY      right hand     No family history on file.  Social History     Tobacco Use    Smoking status: Never    Smokeless tobacco: Never   Substance Use Topics    Alcohol use: Yes     Comment: occasionally    Drug use: No     Review of Systems   Constitutional:  Negative for activity change, appetite change, chills and fever.   HENT:  Negative for congestion, rhinorrhea and sore throat.    Eyes:  Negative for redness and visual disturbance.   Respiratory:  Negative for cough, chest tightness and shortness of breath.    Cardiovascular:  Negative for chest pain.   Gastrointestinal:  Negative for abdominal pain, diarrhea, nausea and vomiting.   Genitourinary:  Positive for flank pain. Negative for difficulty urinating, dysuria and frequency.   Musculoskeletal:  Negative for back pain, gait problem, neck pain and neck stiffness.   Skin:  Negative for rash.   Neurological:  Negative for dizziness, syncope, weakness, numbness and headaches.   Psychiatric/Behavioral:  Negative for confusion.        Physical Exam     Initial Vitals [05/04/24 1536]   BP Pulse Resp Temp SpO2   125/67 69 18 98.5 °F (36.9 °C) 99 %      MAP       --         Physical Exam    Nursing note and vitals  reviewed.  Constitutional: Vital signs are normal. He appears well-developed and well-nourished. He is cooperative.  Non-toxic appearance. He does not have a sickly appearance.   HENT:   Head: Normocephalic and atraumatic.   Right Ear: External ear normal.   Left Ear: External ear normal.   Nose: Nose normal.   Eyes: Conjunctivae and lids are normal.   Neck:    Full passive range of motion without pain.     Cardiovascular:  Normal rate, regular rhythm and normal heart sounds.     Exam reveals no gallop and no friction rub.       No murmur heard.  Pulmonary/Chest: Effort normal and breath sounds normal. He has no wheezes. He has no rhonchi. He has no rales.   Abdominal: Abdomen is soft. There is no abdominal tenderness. There is no rebound and no guarding.   Musculoskeletal:      Cervical back: Full passive range of motion without pain.      Comments: Right CVA tenderness. Right, thoracic paraspinal muscle tenderness. Soft and non-tender abdomen. No rebound or guarding.     Neurological: He is alert and oriented to person, place, and time.   Skin: Skin is warm, dry and intact. No rash noted.         ED Course   Procedures  Labs Reviewed   CBC W/ AUTO DIFFERENTIAL - Abnormal; Notable for the following components:       Result Value    MCH 31.3 (*)     All other components within normal limits   COMPREHENSIVE METABOLIC PANEL - Abnormal; Notable for the following components:    CO2 22 (*)     Total Bilirubin 1.5 (*)     All other components within normal limits   URINALYSIS, REFLEX TO URINE CULTURE - Abnormal; Notable for the following components:    Specific Gravity, UA >1.030 (*)     Protein, UA Trace (*)     Leukocytes, UA 1+ (*)     All other components within normal limits    Narrative:     Specimen Source->Urine   LIPASE   URINALYSIS MICROSCOPIC    Narrative:     Specimen Source->Urine          Imaging Results              CT Renal Stone Study ABD Pelvis WO (Final result)  Result time 05/04/24 16:27:22      Final  result by William Potter MD (05/04/24 16:27:22)                   Impression:      1. No evidence of urinary tract stones or obstructive uropathy.  2. Normal appendix.  No bowel inflammatory change or bowel obstruction appreciated.  3. Possible constipation.  Please correlate clinically  4. 14 mm simple left renal cyst as demonstrated at the time of a prior ultrasound.  5. Small fat containing right inguinal hernia.      Electronically signed by: Emir Potter MD  Date:    05/04/2024  Time:    16:27               Narrative:    EXAMINATION:  CT RENAL STONE STUDY ABD PELVIS WO    CLINICAL HISTORY:  Flank pain, kidney stone suspected;    TECHNIQUE:  3.75 mm contiguous low dose axial images were acquired through the abdomen and pelvis utilizing the CT renal stone study protocol.  No oral contrast material was administered.    COMPARISON:  Renal ultrasound-12/26/2023    FINDINGS:  Kidneys, ureters, and bladder: The kidneys are normal in size, contour, and position without evidence of hydronephrosis or solid renal mass.  There is a 14 mm cyst present in the posterior cortex of the interpolar region of the left kidney on series 2, image 50 as demonstrated at the time of a prior ultrasound performed 12/26/2023.  The kidneys show no evidence stones on today's study.  The ureters are normal in caliber and course without stones.  The urinary bladder shows no evidence of stones or solid mass by non-contrast CT.  The prostate gland is normal in appearance.    Lung bases and mediastinum: The visualized lung bases are unremarkable.  The visualized lower mediastinum show no significant abnormalities.    Remaining soft tissues of the abdomen and pelvis: The liver, gallbladder, spleen, stomach, duodenal C-loop, pancreas, and adrenal glands are unremarkable.  The abdominal aorta is not aneurysmal.  No bulky periaortic or retroperitoneal lymphadenopathy.    The appendix is unremarkable.  There is a moderate volume of stool  present in the colon and rectum.  Please correlate for symptoms of constipation.  No ascites, pneumoperitoneum, or intra-abdominal abscess.  No peritoneal soft tissue nodule or mesenteric adenopathy.  There is a fat containing right inguinal hernia.  No left inguinal hernia.  No inguinal adenopathy.    Bones: There is a minimal grade I retrolisthesis of L5 on S1.  There is degenerative change of the lumbar spine and symphysis pubis..                                       Medications   sodium chloride 0.9% bolus 1,000 mL 1,000 mL (1,000 mLs Intravenous New Bag 5/4/24 1614)   ketorolac injection 15 mg (15 mg Intravenous Given 5/4/24 1614)     Medical Decision Making  Emergent evaluation of a 39-year-old male who presents with right-sided flank/back pain.  Reports right upper shoulder pain for few weeks for which he saw his massage therapist and chiropractor.  He reports pain is worse with movement.  Denies nausea, vomiting or urinary symptoms.  Pain is worse with palpation.  Urinalysis unremarkable.  Vital signs are stable.  Blood work shows no acute abnormalities.  CT scan shows known left renal cyst with no acute findings.  Was given Toradol with improvement. Discussed results with patient. Return precautions given. Based on my clinical evaluation, I do not appreciate any immediate, emergent, or life threatening condition or etiology that warrants additional workup today and feel that the patient can be discharged with close follow up care.  Patient is to follow up with their primary care provider. All questions answered.       Amount and/or Complexity of Data Reviewed  Labs: ordered.  Radiology: ordered.    Risk  Prescription drug management.                                      Clinical Impression:  Final diagnoses:  [R10.9] Right flank pain (Primary)  [N28.1] Renal cyst, left          ED Disposition Condition    Discharge Stable          ED Prescriptions       Medication Sig Dispense Start Date End Date Auth.  Provider    LIDOcaine (LIDODERM) 5 % Place 1 patch onto the skin once daily. Remove & Discard patch within 12 hours or as directed by MD for 9 days 9 patch 5/4/2024 5/13/2024 Lara Villa PA-C    cyclobenzaprine (FLEXERIL) 10 MG tablet Take 1 tablet (10 mg total) by mouth 3 (three) times daily as needed for Muscle spasms. 21 tablet 5/4/2024 5/11/2024 Lara Villa PA-C          Follow-up Information       Follow up With Specialties Details Why Contact Info Additional Information    [facility]  Schedule an appointment as soon as possible for a visit  For follow up if you don't have a primary care provider [phone]     [address]   [place] [address]  [phone]       [facility] -  Emergency Medicine  As needed [address] [place] [address] [address] [place] [phone] 1st floor             Lara Villa PA-C  05/04/24 4740

## 2024-05-06 ENCOUNTER — TELEPHONE (OUTPATIENT)
Dept: UROLOGY | Facility: CLINIC | Age: 39
End: 2024-05-06
Payer: MEDICAID

## 2024-05-06 NOTE — TELEPHONE ENCOUNTER
----- Message from Krysta Garcia sent at 5/6/2024  7:28 AM CDT -----  Needs advice from nurse:      Who Called:pt  Regarding:know if  stones moved due  sharp pains./no appts available in Epic  Would the patient rather a call back or VIA [facility]?  Best Call Back number: [phone]  Additional Info:

## 2024-06-27 ENCOUNTER — OFFICE VISIT (OUTPATIENT)
Dept: UROLOGY | Facility: CLINIC | Age: 39
End: 2024-06-27
Payer: MEDICAID

## 2024-06-27 VITALS — BODY MASS INDEX: 24.13 KG/M2 | WEIGHT: 162.94 LBS | HEIGHT: 69 IN

## 2024-06-27 DIAGNOSIS — N20.0 KIDNEY STONES: Primary | ICD-10-CM

## 2024-06-27 PROCEDURE — 99214 OFFICE O/P EST MOD 30 MIN: CPT | Mod: S$PBB,,, | Performed by: NURSE PRACTITIONER

## 2024-06-27 PROCEDURE — 1160F RVW MEDS BY RX/DR IN RCRD: CPT | Mod: CPTII,,, | Performed by: NURSE PRACTITIONER

## 2024-06-27 PROCEDURE — 99999 PR PBB SHADOW E&M-EST. PATIENT-LVL III: CPT | Mod: PBBFAC,,, | Performed by: NURSE PRACTITIONER

## 2024-06-27 PROCEDURE — 1159F MED LIST DOCD IN RCRD: CPT | Mod: CPTII,,, | Performed by: NURSE PRACTITIONER

## 2024-06-27 PROCEDURE — 99213 OFFICE O/P EST LOW 20 MIN: CPT | Mod: PBBFAC,PO | Performed by: NURSE PRACTITIONER

## 2024-06-27 PROCEDURE — 3008F BODY MASS INDEX DOCD: CPT | Mod: CPTII,,, | Performed by: NURSE PRACTITIONER

## 2024-06-27 NOTE — PROGRESS NOTES
CHIEF COMPLAINT:    Mr. Boewr is a 39 y.o. male presenting for f/u kidney stone.  PRESENTING ILLNESS:    Bonifacio Bower is a 39 y.o. male with a PMH of kidney stones who presents for f/u kidney stone. Last clinic visit was 12/21/23    History with Dr. Bennett  ER 11/18/16: SP/flank pain. 3 mm left UVJ stone without hydronephrosis. Pt's pain is being appropriately controlled. Discharge on Flomax, antiemetics, and pain meds. Provided contact for urologist.  ER 9/12/17: L flank pain x2d with dysuria, +L CVAT. CT scan shows no renal stone.  He was given Toradol with resolution of his symptoms.  Suspect that secondary to musculoskeletal cause     He presents 9/16/19 noting:  Doesn't feel like a stone per se, though has had a kidney infection before and it feels like that  4d ago woke up with chills and had 102 fever and took tylenol and since then has had urinary frequency since then.  Feels tense lower back above hips, radiating towards groin, though this is both sides.  Has had stones 4x and 2 kidney infections. Stones started around 2011. Always passed them. Never caught/strained.  Only saw urology, Dr Delong, last year for vasectomy - no pain no mention of or workup of stones  No perineal, perirectal pain. No pain with ejaculation. Has had those symptoms before and evaluated by Dr Delong and noted nothing wrong.  No dysuria.  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  No high risk sexual encounters or STD concerns  When started feeling febrile had lower back aches.  Changed diet to cut out soft drinks, limited red meat, etc and hasnt had further problems with kidneys    3/30/23  Patient presents today for f/u ED visit, left ureteral stone.  Pt went to ED 3/25/23 for abdominal pain, LLQ, and hematuria. CT RSS resulted renal stone. Patient returned to ED 3/26/23 for continued hematuria and LLQ  "pain. Dr. Sanders reviewed imaging and left ureteral stone seen on CT. Pt was discharged home with flomax and toradol.  3/29/23  Micro UA RBC 19, WBC 0  Creatinine 1.1 / GFR>60  CBC WBC 7.44    Today patient reports pain has improved and last took toradol this morning. He was not provided a strainer. Denies any further episodes of gross hematuria. Denies difficulty voiding. Denies dysuria, fever, chills, nausea or vomiting. He has increased water intake to at least 2 L per day.    He reports he started juicing beets and that could have contributed to his stones.    Per Dr. Sanders:  CT read had said 2 mm stone in left kidney.  Nonobstructing.  Independent review the CTs today does show a distal left ureteral stone.  Sounds like he is passing it since he is also having bladder  Spasms now.  Asked ER to send him home on Flomax and Toradol.  And schedule follow up with us.  He can try to pass the stone for 6 weeks but if he develops pain or fevers needs to have it done sooner.  He should have a repeat CT scan in 4-6 weeks if he does not pass the stone.  Would give him a trial of passage with more Flomax and Toradol.  If he is having severe pain does not pass stone by next week taken to the OR for stone extraction next Wednesday.           Interval history 12/21/23  Pt presents today for f/u kidney stone  He reports his urine is "bubbly/frothy" at times, concerned for protein in urine.   Denies dysuria, gross hematuria, flank pain, fever, chill, nausea or vomiting  Good urinary stream  He has occasional frequency but drinks lots of water and also dandelion tea that has diuretic in it.    Water intake: 4-6 16oz bottles water per day  Following low oxalate diet.     UA negative    Pt did pass ureteral stone after last visit.  Repeat imaging 5/19/23 resulted simple left renal cyst  Urinary stone analysis 4/28/23: 90% Calcium oxalate dihydrate.  10% Calcium phosphate     Interval history 6/27/24 (Jah TOLEDO)  Pt " "presents today for f/u kidney stones  No voiding complaints  Denies dysuria, gross hematuria, flank pain, fever, chills, nausea or vomiting    5/4/24 CT RSS  Kidneys, ureters, and bladder: The kidneys are normal in size, contour, and position without evidence of hydronephrosis or solid renal mass. There is a 14 mm cyst present in the posterior cortex of the interpolar region of the left kidney on series 2, image 50 as demonstrated at the time of a prior ultrasound performed 12/26/2023. The kidneys show no evidence stones on today's study. The ureters are normal in caliber and course without stones. The urinary bladder shows no evidence of stones or solid mass by non-contrast CT. The prostate gland is normal in appearance.     5/4/24 creatinine 1.2 / GFR>60    5/4/24 Micro UA RBC 1, WBC 1    REVIEW OF SYSTEMS:    Review of Systems    Constitutional: Negative for fever and chills.   Gastrointestinal: Negative for nausea, vomiting  Genitourinary:  See above  Neurological: Negative for dizziness.   Psychiatric/Behavioral: Negative for confusion.       PATIENT HISTORY:    No past medical history on file.    Past Surgical History:   Procedure Laterality Date    FRACTURE SURGERY      right hand       PHYSICAL EXAMINATION:    Constitutional: He is oriented to person, place, and time. He appears well-developed and well-nourished.  He is in no apparent distress.    Abdominal:  He exhibits no distension.  There is no CVA tenderness.     Neurological: He is alert and oriented to person, place, and time.     Psych: Cooperative with normal affect.      Physical Exam      LABS:    No results found for: "PSA", "PSADIAG", "PSATOTAL", "PSAFREE", "PSAFREEPCT"  Lab Results   Component Value Date    CREATININE 1.2 05/04/2024         IMPRESSION:    Encounter Diagnoses   Name Primary?    Kidney stones Yes       PLAN:  -CT resulted no kidney stones  -Continue stone prevention  -General risk factors for kidney stones and the conservative " measures to prevent kidney stones in the future were discussed with the patient in detail.  The patient was encouraged to drink 2-3 liters of water a day, limit iced tea and tal as well as foods high in oxalate.  They were cautioned to try to limit salt and red meat intake. Low oxalate diet (limit spinach, rhubarb, nuts, beets, potatoes, chocolate).  No vitamin C supplements. We also discussed adding citrate to the diet with the addition of felicitas or lemon juice to their water or alternatively with crystal light.   -RTC as needed      I encouraged him or any of his family members to call or email me with questions and/or concerns.      30 minutes of total time spent on the encounter, which includes face to face time and non-face to face time preparing to see the patient (eg, review of tests), Obtaining and/or reviewing separately obtained history, Documenting clinical information in the electronic or other health record, Independently interpreting results (not separately reported) and communicating results to the patient/family/caregiver, or Care coordination (not separately reported).

## 2024-07-29 ENCOUNTER — OFFICE VISIT (OUTPATIENT)
Dept: FAMILY MEDICINE | Facility: CLINIC | Age: 39
End: 2024-07-29
Payer: MEDICAID

## 2024-07-29 VITALS
TEMPERATURE: 97 F | SYSTOLIC BLOOD PRESSURE: 126 MMHG | DIASTOLIC BLOOD PRESSURE: 84 MMHG | OXYGEN SATURATION: 98 % | RESPIRATION RATE: 18 BRPM | HEIGHT: 69 IN | BODY MASS INDEX: 26.81 KG/M2 | HEART RATE: 79 BPM | WEIGHT: 181 LBS

## 2024-07-29 DIAGNOSIS — Z00.00 PREVENTATIVE HEALTH CARE: ICD-10-CM

## 2024-07-29 DIAGNOSIS — S49.91XD INJURY OF RIGHT SHOULDER, SUBSEQUENT ENCOUNTER: Primary | ICD-10-CM

## 2024-07-29 DIAGNOSIS — N20.0 KIDNEY STONES, CALCIUM OXALATE: ICD-10-CM

## 2024-07-29 PROBLEM — S49.91XA INJURY OF RIGHT SHOULDER: Status: ACTIVE | Noted: 2024-07-29

## 2024-07-29 PROCEDURE — 3008F BODY MASS INDEX DOCD: CPT | Mod: CPTII,,, | Performed by: FAMILY MEDICINE

## 2024-07-29 PROCEDURE — 99999 PR PBB SHADOW E&M-EST. PATIENT-LVL III: CPT | Mod: PBBFAC,,, | Performed by: FAMILY MEDICINE

## 2024-07-29 PROCEDURE — 3079F DIAST BP 80-89 MM HG: CPT | Mod: CPTII,,, | Performed by: FAMILY MEDICINE

## 2024-07-29 PROCEDURE — 99213 OFFICE O/P EST LOW 20 MIN: CPT | Mod: PBBFAC,PN | Performed by: FAMILY MEDICINE

## 2024-07-29 PROCEDURE — 3074F SYST BP LT 130 MM HG: CPT | Mod: CPTII,,, | Performed by: FAMILY MEDICINE

## 2024-07-29 PROCEDURE — 99204 OFFICE O/P NEW MOD 45 MIN: CPT | Mod: S$PBB,,, | Performed by: FAMILY MEDICINE

## 2024-07-29 PROCEDURE — 1159F MED LIST DOCD IN RCRD: CPT | Mod: CPTII,,, | Performed by: FAMILY MEDICINE

## 2024-07-29 NOTE — PROGRESS NOTES
Subjective:       Patient ID: Bonifacio Bower is a 39 y.o. male.    Chief Complaint: Establish Care and Shoulder Pain      Is here to get established he has been healthy other than history of kidney stones he did have 1 tested and is calcium oxalate so he is on an oxalate restricted diet.  Lab Results       Component                Value               Date                       WBC                      9.20                05/04/2024                 HGB                      14.6                05/04/2024                 HCT                      41.2                05/04/2024                 PLT                      264                 05/04/2024                 ALT                      16                  05/04/2024                 AST                      20                  05/04/2024                 NA                       139                 05/04/2024                 K                        3.7                 05/04/2024                 CL                       105                 05/04/2024                 CREATININE               1.2                 05/04/2024                 BUN                      14                  05/04/2024                 CO2                      22 (L)              05/04/2024                    Shoulder Pain   Pertinent negatives include no headaches.       Allergies and Medications:   Review of patient's allergies indicates:  No Known Allergies  No current outpatient medications on file.     No current facility-administered medications for this visit.       Family History:   No family history on file.    Social History:   Social History     Socioeconomic History    Marital status:    Tobacco Use    Smoking status: Never    Smokeless tobacco: Never   Substance and Sexual Activity    Alcohol use: Yes     Comment: occasionally    Drug use: No    Sexual activity: Yes     Partners: Female     Social Determinants of Health     Financial Resource Strain: Low Risk  (7/28/2024)     Overall Financial Resource Strain (CARDIA)     Difficulty of Paying Living Expenses: Not very hard   Food Insecurity: No Food Insecurity (7/28/2024)    Hunger Vital Sign     Worried About Running Out of Food in the Last Year: Never true     Ran Out of Food in the Last Year: Never true   Transportation Needs: No Transportation Needs (3/29/2023)    PRAPARE - Transportation     Lack of Transportation (Medical): No     Lack of Transportation (Non-Medical): No   Physical Activity: Insufficiently Active (7/28/2024)    Exercise Vital Sign     Days of Exercise per Week: 2 days     Minutes of Exercise per Session: 20 min   Stress: Stress Concern Present (7/28/2024)    [employer] - Occupational Stress Questionnaire     Feeling of Stress : To some extent   Housing Stability: Unknown (3/29/2023)    Housing Stability Vital Sign     Unable to Pay for Housing in the Last Year: No     Unstable Housing in the Last Year: No       Review of Systems   Constitutional:  Positive for activity change and unexpected weight change.   HENT:  Negative for hearing loss, rhinorrhea and trouble swallowing.    Eyes:  Negative for discharge and visual disturbance.   Respiratory:  Negative for chest tightness and wheezing.    Cardiovascular:  Negative for chest pain and palpitations.   Gastrointestinal:  Negative for blood in stool, constipation, diarrhea and vomiting.   Endocrine: Negative for polydipsia and polyuria.   Genitourinary:  Negative for difficulty urinating, hematuria and urgency.   Musculoskeletal:  Negative for arthralgias, joint swelling and neck pain.   Neurological:  Negative for weakness and headaches.   Psychiatric/Behavioral:  Negative for confusion and dysphoric mood.        Objective:     Vitals:    07/29/24 0812   BP: 126/84   Pulse: 79   Resp: 18   Temp: 97.3 °F (36.3 °C)        Physical Exam  Vitals and nursing note reviewed.   Constitutional:       Appearance: He is well-developed. He is not  diaphoretic.   HENT:      Head: Normocephalic.   Eyes:      Conjunctiva/sclera: Conjunctivae normal.      Pupils: Pupils are equal, round, and reactive to light.   Cardiovascular:      Rate and Rhythm: Normal rate and regular rhythm.      Heart sounds: Normal heart sounds. No murmur heard.     No friction rub. No gallop.   Pulmonary:      Effort: Pulmonary effort is normal. No respiratory distress.      Breath sounds: Normal breath sounds. No stridor. No wheezing or rales.   Chest:      Chest wall: No tenderness.   Musculoskeletal:        Arms:       Right lower leg: No edema.      Left lower leg: No edema.   Skin:     General: Skin is warm and dry.   Psychiatric:         Behavior: Behavior normal.         Thought Content: Thought content normal.         Judgment: Judgment normal.         Assessment:       1. Injury of right shoulder, subsequent encounter    2. Kidney stones, calcium oxalate    3. Preventative health care        Plan:       Bonifacio was seen today for establish care and shoulder pain.    Diagnoses and all orders for this visit:    Injury of right shoulder, subsequent encounter  -     Ambulatory referral/consult to Physical/Occupational Therapy; Future    Kidney stones, calcium oxalate  -     Vitamin D; Future    Preventative health care  -     LIPID PANEL; Future  -     HEMOGLOBIN A1C; Future  -     Comprehensive Metabolic Panel; Future         Follow up in about 3 months (around 10/29/2024) for follow up rotator cuff cuff tendinitis.

## 2024-08-08 ENCOUNTER — CLINICAL SUPPORT (OUTPATIENT)
Dept: REHABILITATION | Facility: HOSPITAL | Age: 39
End: 2024-08-08
Payer: MEDICAID

## 2024-08-08 DIAGNOSIS — R29.898 WEAKNESS OF RIGHT SHOULDER: ICD-10-CM

## 2024-08-08 DIAGNOSIS — M25.611 DECREASED SHOULDER MOBILITY, RIGHT: Primary | ICD-10-CM

## 2024-08-08 DIAGNOSIS — S49.91XD INJURY OF RIGHT SHOULDER, SUBSEQUENT ENCOUNTER: ICD-10-CM

## 2024-08-08 PROCEDURE — 97161 PT EVAL LOW COMPLEX 20 MIN: CPT | Mod: PN

## 2024-08-11 NOTE — PLAN OF CARE
[facility]   Physical Therapy Initial Evaluation      Name: Bonifacio Bower  [facility] Number: [id number]    Therapy Diagnosis:   Encounter Diagnoses   Name Primary?    Injury of right shoulder, subsequent encounter     Decreased shoulder mobility, right Yes    Weakness of right shoulder         Physician: Jeffery Bruce MD    Physician Orders: PT Eval and Treat   Medical Diagnosis from Referral: S49.91XD (ICD-10-CM) - Injury of right shoulder, subsequent encounter   Evaluation Date: 8/8/2024  Authorization Period Expiration: 07/29/2025  Plan of Care Expiration: 10/03/2024  Progress Note Due: 09/07/2024  Date of Surgery: N/A  Visit # / Visits authorized: 1/ 1   FOTO: 1/ 3    Precautions: Standard     Time In: 9:00  Time Out: 10:00  Total Billable Time: 60 minutes    Subjective     Date of onset: about 5 years ago or so    History of current condition - Bonifacio reports: playing baseball in the past, Right hand dominant, boxing and a lot of impact sports too, recently he has pain November and December. 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 He has found that some of his weightbearing work outs has increased his shoulder pain. Running sometimes irritates. He got a steroid shot and muscle relaxer for his Right shoulder, the injection helped but on;ly for a short period.     Falls: none    Imaging: none:     Prior Therapy: none   Social History:  lives with their spouse  Occupation:   Prior Level of Function: independent with all ADLs, self care and leisurely task  Current Level of Function: as above     Pain:  Current 1/10, worst 6/10, best 0/10   Location: right shoulder    Description: needs to be popped  Aggravating Factors: Extension, Flexing, Lifting, and certain movements  Easing Factors: meditation, injection, and rest    Patients goals: Learn how to manage shoulder and have better motion;  learn better ways to strengthen; avoid surgery.      Medical History:   No past medical history on file.    Surgical History:   Bonifacio Bower  has a past surgical history that includes Fracture surgery.    Medications:   Bonifacio currently has no medications in their medication list.    Allergies:   Review of patient's allergies indicates:  No Known Allergies     Objective      Observation: depressed shoulder on the Right and, protracted shoulders, scapular winging noted B, internally rotated humerus     Posture: as above       Passive Range of Motion:   Shoulder Right Left   Flexion 180 180   Abduction 180 180   ER at 0 50 50   ER at 90 90 90   IR 50 50      Active Range of Motion:   Shoulder Right Left   Flexion 170 180   Abduction 170 180   ER at 0 50 50   ER at 90 90 90   IR 50 50   Functional IR  Belt loops TL junc     Strength:  Shoulder Right Left   Flexion 5/5 5/5   Abduction 5/5 5/5   ER 4-/5 5/5   IR 5/5 5/5   Serratus Anterior 3/5 3/5   Middle Trap 3+/5 3/5   Low Trap 3-/5 3-/5       Special Tests:   Right Left   Empty Can Test + -   Hawkin's Kenndy - -   Neer's Test - -   Speed's test - -   Anterior Apprehension test - -     Joint Mobility: normal     Palpation: no tenderness to palpation noted     Sensation: intact in BUE     Flexibility: +  Lat: R + ; L +   Pec Minor: R - ; L -     Intake Outcome Measure for FOTO Shoulder Survey    Therapist reviewed FOTO scores for Bonifacio Bower on 8/8/2024.   FOTO report - see Media section or FOTO account episode details.    Intake Score: 60%         Treatment     Total Treatment time (time-based codes) separate from Evaluation: 14 minutes     Bonifacio received the treatments listed below:      therapeutic activities to improve functional performance for 14  minutes, including:  Wall slides with RB cuff x10  Shoulder ER/IR walkout, x10 B (green theraband)  Prone middle trap level 2, x10    Patient Education and Home Exercises     Education provided:   - HEP  education  - POC education     Written Home Exercises Provided: Yes. Exercises were reviewed and Bonifacio was able to demonstrate them prior to the end of the session.  Bonifacio demonstrated good  understanding of the education provided. See EMR under Patient Instructions for exercises provided during therapy sessions.    Assessment     Bonifacio is a 39 y.o. male referred to outpatient Physical Therapy with a medical diagnosis of S49.91XD (ICD-10-CM) - Injury of right shoulder, subsequent encounter. Patient presents with signs and symptom consistent with RTC dysfunction due to postural deficits. Bonifacio has limited shoulder range of motion due to periscapular weakness that is limiting his shoulders ability to flex completely. The weakness in his periscapular muscles is depressing and internally rotation his shoulder and leading to compression of his shoulder external rotators. His symptoms are able to be reproduced with scapular assist and humeral head posterior relocation. Bonifacio is limited in his ADLs, self care and leisurely task due to the deficits noted above. He makes for a good candidate to receive skilled Physical Therapy to improve the above listed deficits.     Patient prognosis is Good.   Patient will benefit from skilled outpatient Physical Therapy to address the deficits stated above and in the chart below, provide patient /family education, and to maximize patientt's level of independence.     Plan of care discussed with patient: Yes  Patient's spiritual, cultural and educational needs considered and patient is agreeable to the plan of care and goals as stated below:     Anticipated Barriers for therapy: none noted    Medical Necessity is demonstrated by the following  History  Co-morbidities and personal factors that may impact the plan of care [x] LOW: no personal factors / co-morbidities  [] MODERATE: 1-2 personal factors / co-morbidities  [] HIGH: 3+ personal factors / co-morbidities    Moderate /  High Support Documentation:   Co-morbidities affecting plan of care:     Personal Factors:   no deficits     Examination  Body Structures and Functions, activity limitations and participation restrictions that may impact the plan of care [x] LOW: addressing 1-2 elements  [] MODERATE: 3+ elements  [] HIGH: 4+ elements (please support below)    Moderate / High Support Documentation:      Clinical Presentation [x] LOW: stable  [] MODERATE: Evolving  [] HIGH: Unstable     Decision Making/ Complexity Score: low       Goals:  Short Term Goals: 4 weeks. Pt agrees with goals set.  Pt will demonstrate independence and compliance with initial HEP to improve independence and symptom management.   Pt will report Right shoulder pain </= 0/10 with active range of motion and reaching over head to demonstrate improved condition and ability to complete his ADLs and self care.    Pt will improve MMT of scapular upward rotators to >/= 3+/5 to improve tolerance for shoulder flexion and reading over head.      Long Term Goals: 8 weeks. Pt agrees with goals set.   Pt will demonstrate independence and compliance with final HEP to continue managing symptoms and developing mobility, motor control and strength.    Pt will improve FOTO score to </= 60% limited to demonstrate improved functional mobility.    Pt will report Right shoulder pain </= 0/10 with weight bearing and shoulder ER to demonstrate improved condition and ability to complete his ADLs, self care and return to the gym.    Pt will improve MMT of Right RTC to >/= 5/5 to improve tolerance for reaching overhead and completing his work related task.    Pt will improve Right shoulder ROM = to uninvolved side to improve tolerance for reaching over head.    Pt goal:  Learn how to manage shoulder and have better motion; learn better ways to strengthen; avoid surgery.     Plan     Plan of care Certification: 8/8/2024 to 10/03/2024.    Outpatient Physical Therapy 2 times weekly for 6  weeks to include the following interventions: Electrical Stimulation NMES, Gait Training, Manual Therapy, Moist Heat/ Ice, Neuromuscular Re-ed, Patient Education, Self Care, Therapeutic Activities, and Therapeutic Exercise.     George Castro PT, DPT         Physician's Signature: _________________________________________ Date: ________________

## 2024-08-13 ENCOUNTER — CLINICAL SUPPORT (OUTPATIENT)
Dept: REHABILITATION | Facility: HOSPITAL | Age: 39
End: 2024-08-13
Payer: MEDICAID

## 2024-08-13 DIAGNOSIS — R29.898 WEAKNESS OF RIGHT SHOULDER: ICD-10-CM

## 2024-08-13 DIAGNOSIS — M25.611 DECREASED SHOULDER MOBILITY, RIGHT: Primary | ICD-10-CM

## 2024-08-13 PROCEDURE — 97110 THERAPEUTIC EXERCISES: CPT | Mod: PN

## 2024-08-13 NOTE — PROGRESS NOTES
[facility]   Physical Therapy Treatment Note     Name: Bonifacio Bower  Clinic Number: [id number]    Therapy Diagnosis:   Encounter Diagnoses   Name Primary?    Decreased shoulder mobility, right Yes    Weakness of right shoulder      Physician: Jeffery Bruce MD    Visit Date: 8/13/2024    Physician Orders: PT Eval and Treat   Medical Diagnosis from Referral: S49.91XD (ICD-10-CM) - Injury of right shoulder, subsequent encounter   Evaluation Date: 8/8/2024  Authorization Period Expiration: 12/31/2024  Plan of Care Expiration: 10/03/2024  Progress Note Due: 09/07/2024  Date of Surgery: N/A  Visit # / Visits authorized: 1/ 20   FOTO: 1/ 3     Precautions: Standard     PTA Visit #: 0/5     FOTO first follow up:   FOTO second follow up:     Time In: 3:00  Time Out: 3:51  Total Billable Time: 51 minutes    SUBJECTIVE     Pt reports: He is doing alright.  He was not compliant with home exercise program.  Response to previous treatment: improved shoulder range of motion   Functional change: as above     Pain: 0/10  Location: right shoulder      OBJECTIVE     Objective Measures updated at progress report unless specified.     Treatment     Bonifacio received the treatments listed below:      therapeutic exercises to develop strength, endurance, ROM, flexibility, and posture for 51 minutes including:  Thoracic extension with stool   SA wall slides  Land mines with 8#   Prone middle trap leel 2, 3 x 12  Prone lower trap level 2,  3 x 12  SHOulder ER/IR walkout 3 x 12    Prone shoulder internal rotation , 4 x8   Body blade (yellow) ER/IR 4 x 30 seconds     manual therapy techniques: Joint mobilizations were applied to the: Right shoulder for 00 minutes, including:      neuromuscular re-education activities to improve: Balance, Coordination, Kinesthetic, Sense, and Proprioception for 00 minutes. The following activities were included:      therapeutic activities to improve functional performance for  00  minutes, including:      Patient Education and Home Exercises     Home Exercises Provided and Patient Education Provided     Education provided:   - HEP education  - POC education     Written Home Exercises Provided: yes. Exercises were reviewed and Bonifacio was able to demonstrate them prior to the end of the session.  Bonifacio demonstrated good  understanding of the education provided. See EMR under Patient Instructions for exercises provided during therapy sessions    ASSESSMENT     Bonifacio completed therapy with no complications. Therapy focused on developing his periscapular strength along with his RTC motor control. He responded well to all interventions. Therapy will look to advance as tolerated.     Bonifacio Is progressing well towards his goals.   Pt prognosis is Good.     Pt will continue to benefit from skilled outpatient physical therapy to address the deficits listed in the problem list box on initial evaluation, provide pt/family education and to maximize pt's level of independence in the home and community environment.     Pt's spiritual, cultural and educational needs considered and pt agreeable to plan of care and goals.     Anticipated barriers to physical therapy: none noted at this time     Goals:  Short Term Goals: 4 weeks. Pt agrees with goals set.  Pt will demonstrate independence and compliance with initial HEP to improve independence and symptom management.   Pt will report Right shoulder pain </= 0/10 with active range of motion and reaching over head to demonstrate improved condition and ability to complete his ADLs and self care.    Pt will improve MMT of scapular upward rotators to >/= 3+/5 to improve tolerance for shoulder flexion and reading over head.       Long Term Goals: 8 weeks. Pt agrees with goals set.   Pt will demonstrate independence and compliance with final HEP to continue managing symptoms and developing mobility, motor control and strength.    Pt will improve FOTO score  to </= 60% limited to demonstrate improved functional mobility.    Pt will report Right shoulder pain </= 0/10 with weight bearing and shoulder ER to demonstrate improved condition and ability to complete his ADLs, self care and return to the gym.    Pt will improve MMT of Right RTC to >/= 5/5 to improve tolerance for reaching overhead and completing his work related task.    Pt will improve Right shoulder ROM = to uninvolved side to improve tolerance for reaching over head.    Pt goal:  Learn how to manage shoulder and have better motion; learn better ways to strengthen; avoid surgery.     PLAN     Develop periscapular strength and RTC strength    George Castro, PT, DPT

## 2024-08-22 ENCOUNTER — CLINICAL SUPPORT (OUTPATIENT)
Dept: REHABILITATION | Facility: HOSPITAL | Age: 39
End: 2024-08-22
Payer: MEDICAID

## 2024-08-22 DIAGNOSIS — M25.611 DECREASED SHOULDER MOBILITY, RIGHT: Primary | ICD-10-CM

## 2024-08-22 DIAGNOSIS — R29.898 WEAKNESS OF RIGHT SHOULDER: ICD-10-CM

## 2024-08-22 PROCEDURE — 97110 THERAPEUTIC EXERCISES: CPT | Mod: PN

## 2024-08-22 NOTE — PROGRESS NOTES
[facility]   Physical Therapy Treatment Note     Name: Bonifacio Bower  Clinic Number: [id number]    Therapy Diagnosis:   Encounter Diagnoses   Name Primary?    Decreased shoulder mobility, right Yes    Weakness of right shoulder      Physician: Jeffery Bruce MD    Visit Date: 8/22/2024    Physician Orders: PT Eval and Treat   Medical Diagnosis from Referral: S49.91XD (ICD-10-CM) - Injury of right shoulder, subsequent encounter   Evaluation Date: 8/8/2024  Authorization Period Expiration: 12/31/2024  Plan of Care Expiration: 10/03/2024  Progress Note Due: 09/07/2024  Date of Surgery: N/A  Visit # / Visits authorized: 2/ 20   FOTO: 1/ 3     Precautions: Standard     PTA Visit #: 0/5     FOTO first follow up:   FOTO second follow up:     Time In: 9:00  Time Out: 9:49  Total Billable Time: 49 minutes    SUBJECTIVE     Pt reports: He is feeling a little tight today, but overall okay.  He was not compliant with home exercise program.  Response to previous treatment: improved shoulder range of motion   Functional change: as above     Pain: 0/10  Location: right shoulder      OBJECTIVE     Objective Measures updated at progress report unless specified.     Increased pec minor tightness noted on the RUE   Shoulder ER/IR MMT: 5/5 in RUE at 90/90    Treatment     Bonifacio received the treatments listed below:      therapeutic exercises to develop strength, endurance, ROM, flexibility, and posture for 51 minutes including:  Supine pec stretch with foam roller, 2 x 30 seconds   UBE, 3'3, level 2, 1 minute fast and 1 minute slow.   Prone middle trap level 2, 3 x 12  Prone lower trap level 2,  3 x 12  SHOulder ER/IR walkout 3 x 12  (green for ER, blue for IR)   Prone shoulder internal rotation , 4 x8    hiker, 1#, 3 x 8 B    manual therapy techniques: Joint mobilizations were applied to the: Right shoulder for 00 minutes, including:      neuromuscular re-education activities to improve:  Balance, Coordination, Kinesthetic, Sense, and Proprioception for 00 minutes. The following activities were included:      therapeutic activities to improve functional performance for 00  minutes, including:      Patient Education and Home Exercises     Home Exercises Provided and Patient Education Provided     Education provided:   - HEP education  - POC education     Written Home Exercises Provided: yes. Exercises were reviewed and Bonifacio was able to demonstrate them prior to the end of the session.  Bonifacio demonstrated good  understanding of the education provided. See EMR under Patient Instructions for exercises provided during therapy sessions    ASSESSMENT     Bonifacio is doing well at this time; he has less RTC pain and irritation and full range of motion. He reports some tightness and clicking but less pain. Therapy focused on developing his periscapular strength and overall static posture. He responded well to all interventions, with great affection for the lower trap interventions. Therapy will advance as tolerated.     Bonifacio Is progressing well towards his goals.   Pt prognosis is Good.     Pt will continue to benefit from skilled outpatient physical therapy to address the deficits listed in the problem list box on initial evaluation, provide pt/family education and to maximize pt's level of independence in the home and community environment.     Pt's spiritual, cultural and educational needs considered and pt agreeable to plan of care and goals.     Anticipated barriers to physical therapy: none noted at this time     Goals:  Short Term Goals: 4 weeks. Pt agrees with goals set.  Pt will demonstrate independence and compliance with initial HEP to improve independence and symptom management.   Pt will report Right shoulder pain </= 0/10 with active range of motion and reaching over head to demonstrate improved condition and ability to complete his ADLs and self care.    Pt will improve MMT of scapular  upward rotators to >/= 3+/5 to improve tolerance for shoulder flexion and reading over head.       Long Term Goals: 8 weeks. Pt agrees with goals set.   Pt will demonstrate independence and compliance with final HEP to continue managing symptoms and developing mobility, motor control and strength.    Pt will improve FOTO score to </= 60% limited to demonstrate improved functional mobility.    Pt will report Right shoulder pain </= 0/10 with weight bearing and shoulder ER to demonstrate improved condition and ability to complete his ADLs, self care and return to the gym.    Pt will improve MMT of Right RTC to >/= 5/5 to improve tolerance for reaching overhead and completing his work related task.    Pt will improve Right shoulder ROM = to uninvolved side to improve tolerance for reaching over head.    Pt goal:  Learn how to manage shoulder and have better motion; learn better ways to strengthen; avoid surgery.     PLAN     Develop periscapular strength and RTC strength    George Castro, PT, DPT, DPT

## 2024-08-26 ENCOUNTER — CLINICAL SUPPORT (OUTPATIENT)
Dept: REHABILITATION | Facility: HOSPITAL | Age: 39
End: 2024-08-26
Payer: MEDICAID

## 2024-08-26 DIAGNOSIS — M25.611 DECREASED SHOULDER MOBILITY, RIGHT: Primary | ICD-10-CM

## 2024-08-26 DIAGNOSIS — R29.898 WEAKNESS OF RIGHT SHOULDER: ICD-10-CM

## 2024-08-26 PROCEDURE — 97110 THERAPEUTIC EXERCISES: CPT | Mod: PN

## 2024-08-26 NOTE — PROGRESS NOTES
[facility]   Physical Therapy Treatment Note     Name: Bonifacio Bower  Clinic Number: [id number]    Therapy Diagnosis:   Encounter Diagnoses   Name Primary?    Decreased shoulder mobility, right Yes    Weakness of right shoulder      Physician: Jeffery Bruce MD    Visit Date: 8/26/2024    Physician Orders: PT Eval and Treat   Medical Diagnosis from Referral: S49.91XD (ICD-10-CM) - Injury of right shoulder, subsequent encounter   Evaluation Date: 8/8/2024  Authorization Period Expiration: 12/31/2024  Plan of Care Expiration: 10/03/2024  Progress Note Due: 09/07/2024  Date of Surgery: N/A  Visit # / Visits authorized: 2/ 20   FOTO: 1/ 3     Precautions: Standard     PTA Visit #: 0/5     FOTO first follow up:   FOTO second follow up:     Time In: 9:00  Time Out: 9:49  Total Billable Time: 49 minutes    SUBJECTIVE     Pt reports: He is feeling alright, his shoulder just feels warm.   He was not compliant with home exercise program.  Response to previous treatment: improved shoulder range of motion   Functional change: as above     Pain: 0/10  Location: right shoulder      OBJECTIVE     Objective Measures updated at progress report unless specified.     Increased pec minor tightness noted on the RUE   Shoulder ER/IR MMT: 5/5 in RUE at 90/90    Treatment     Bonifacio received the treatments listed below:      therapeutic exercises to develop strength, endurance, ROM, flexibility, and posture for 51 minutes including:  Supine pec stretch with foam roller, 2 x 30 seconds   UBE, 6' level 3, 1 minute fast and 1 minute slow.   Foam rolling, 3 minutes   Prone middle trap level 3, 3 x 10, 1# B on green swiss ball  Prone lower trap level 3,  3 x 10, 1# B on green swiss ball  SHOulder ER/IR walkout 3 x 12  (green for ER, blue for IR)   Prone shoulder internal rotation 2#, 4 x8    hiker, 1#, 2 x 8 RUE   hiker, 2#, 2 x 8 RUE   Stir the pot, green swiss ball and airex pad, x20 B   Black  Body blade, front to back, 3 x 1 minute    Education on running form to decrease the demand on anterior shoulder.     manual therapy techniques: Joint mobilizations were applied to the: Right shoulder for 00 minutes, including:      neuromuscular re-education activities to improve: Balance, Coordination, Kinesthetic, Sense, and Proprioception for 00 minutes. The following activities were included:      therapeutic activities to improve functional performance for 00  minutes, including:      Patient Education and Home Exercises     Home Exercises Provided and Patient Education Provided     Education provided:   - HEP education  - POC education     Written Home Exercises Provided: yes. Exercises were reviewed and Bonifacio was able to demonstrate them prior to the end of the session.  Bonifacio demonstrated good  understanding of the education provided. See EMR under Patient Instructions for exercises provided during therapy sessions    ASSESSMENT     Bonifacio completed therapy with no complications: therapy was focused on progressing his shoulder stability interventions. He responded well to all, reporting no pain with reaching behind his back or internal rotation. Therapy will continue to advance as tolerated and possibly include weightbearing in his next session.    Bonifacio Is progressing well towards his goals.   Pt prognosis is Good.     Pt will continue to benefit from skilled outpatient physical therapy to address the deficits listed in the problem list box on initial evaluation, provide pt/family education and to maximize pt's level of independence in the home and community environment.     Pt's spiritual, cultural and educational needs considered and pt agreeable to plan of care and goals.     Anticipated barriers to physical therapy: none noted at this time     Goals:  Short Term Goals: 4 weeks. Pt agrees with goals set.  Pt will demonstrate independence and compliance with initial HEP to improve independence and  symptom management.   Pt will report Right shoulder pain </= 0/10 with active range of motion and reaching over head to demonstrate improved condition and ability to complete his ADLs and self care.    Pt will improve MMT of scapular upward rotators to >/= 3+/5 to improve tolerance for shoulder flexion and reading over head.       Long Term Goals: 8 weeks. Pt agrees with goals set.   Pt will demonstrate independence and compliance with final HEP to continue managing symptoms and developing mobility, motor control and strength.    Pt will improve FOTO score to </= 60% limited to demonstrate improved functional mobility.    Pt will report Right shoulder pain </= 0/10 with weight bearing and shoulder ER to demonstrate improved condition and ability to complete his ADLs, self care and return to the gym.    Pt will improve MMT of Right RTC to >/= 5/5 to improve tolerance for reaching overhead and completing his work related task.    Pt will improve Right shoulder ROM = to uninvolved side to improve tolerance for reaching over head.    Pt goal:  Learn how to manage shoulder and have better motion; learn better ways to strengthen; avoid surgery.     PLAN     Develop periscapular strength and RTC strength    George Castro, PT, DPT, DPT

## 2024-08-29 ENCOUNTER — CLINICAL SUPPORT (OUTPATIENT)
Dept: REHABILITATION | Facility: HOSPITAL | Age: 39
End: 2024-08-29
Payer: MEDICAID

## 2024-08-29 DIAGNOSIS — R29.898 WEAKNESS OF RIGHT SHOULDER: ICD-10-CM

## 2024-08-29 DIAGNOSIS — M25.611 DECREASED SHOULDER MOBILITY, RIGHT: Primary | ICD-10-CM

## 2024-08-29 PROCEDURE — 97110 THERAPEUTIC EXERCISES: CPT | Mod: PN

## 2024-08-29 NOTE — PROGRESS NOTES
[facility]   Physical Therapy Treatment Note     Name: Bonifacio Bower  [facility] Number: [id number]    Therapy Diagnosis:   Encounter Diagnoses   Name Primary?    Decreased shoulder mobility, right Yes    Weakness of right shoulder      Physician: Jeffery Bruce MD    Visit Date: 8/29/2024    Physician Orders: PT Eval and Treat   Medical Diagnosis from Referral: S49.91XD (ICD-10-CM) - Injury of right shoulder, subsequent encounter   Evaluation Date: 8/8/2024  Authorization Period Expiration: 12/31/2024  Plan of Care Expiration: 10/03/2024  Progress Note Due: 09/07/2024  Date of Surgery: N/A  Visit # / Visits authorized: 4/ 20   FOTO: 1/ 3     Precautions: Standard     PTA Visit #: 0/5     FOTO first follow up:   FOTO second follow up:     Time In: 9:08  Time Out: 9:57  Total Billable Time: 49 minutes    SUBJECTIVE     Pt reports: He has had some neck discomfort lately, but nothing too bad. He was cleaning his car and felt it. His shoulder feels fine.   He was not compliant with home exercise program.  Response to previous treatment: improved shoulder range of motion   Functional change: as above     Pain: 0/10  Location: right shoulder      OBJECTIVE     Objective Measures updated at progress report unless specified.     Increased pec minor tightness noted on the RUE   Shoulder ER/IR MMT: 5/5 in RUE at 90/90    Cervical spine clearing: - alar, - transverse, - Vertebral artery, - jeffersons  Limited closing on the Left and Right       Treatment     Bonifacio received the treatments listed below:      therapeutic exercises to develop strength, endurance, ROM, flexibility, and posture for 49 minutes including:    Cervical spine closing HVLAT at C4 on C5 and C5 on C6  standing pec stretch with 3 x 15 seconds   UBE, 4' level 3, 1 minute fast and 1 minute slow.   Foam rolling, 3 minutes   Prone middle trap level 3, 3 x 8, 1# B on green swiss ball  Prone lower trap level 3,  3 x 8, 1# B on green  swiss ball  90/90 shoulder ER, green theraband, 3 x 8  90/90 Shoulder IR, blue theraband, 3 x8   Shoulder taps on 12in block, x20  Push ups on 12in block, x20  Push ups on normal ground, x10   Seated row machine form education    Education on running form to decrease the demand on anterior shoulder.     manual therapy techniques: Joint mobilizations were applied to the: Right shoulder for 00 minutes, including:      neuromuscular re-education activities to improve: Balance, Coordination, Kinesthetic, Sense, and Proprioception for 00 minutes. The following activities were included:      therapeutic activities to improve functional performance for 00  minutes, including:      Patient Education and Home Exercises     Home Exercises Provided and Patient Education Provided     Education provided:   - HEP education  - POC education     Written Home Exercises Provided: yes. Exercises were reviewed and Bonifacio was able to demonstrate them prior to the end of the session.  Bonifacio demonstrated good  understanding of the education provided. See EMR under Patient Instructions for exercises provided during therapy sessions    ASSESSMENT     Bonifacio reported to therapy with full and pain free shoulder range of motion; his main complaint was increased neck pain following car cleaning. His symptoms were reduced following manual therapy. Therapy focused on progressing his RTC strengthening and incorporating weightbearing into his BUE. He tolerated all interventions well and reported feeling confident in returning to his ADLs and normal workout routine. Therapy will advance as tolerated.     Bonifacio Is progressing well towards his goals.   Pt prognosis is Good.     Pt will continue to benefit from skilled outpatient physical therapy to address the deficits listed in the problem list box on initial evaluation, provide pt/family education and to maximize pt's level of independence in the home and community environment.     Pt's  spiritual, cultural and educational needs considered and pt agreeable to plan of care and goals.     Anticipated barriers to physical therapy: none noted at this time     Goals:  Short Term Goals: 4 weeks. Pt agrees with goals set.  Pt will demonstrate independence and compliance with initial HEP to improve independence and symptom management. MET  Pt will report Right shoulder pain </= 0/10 with active range of motion and reaching over head to demonstrate improved condition and ability to complete his ADLs and self care.  MET  Pt will improve MMT of scapular upward rotators to >/= 3+/5 to improve tolerance for shoulder flexion and reading over head.  MET     Long Term Goals: 8 weeks. Pt agrees with goals set.   Pt will demonstrate independence and compliance with final HEP to continue managing symptoms and developing mobility, motor control and strength.    Pt will improve FOTO score to </= 60% limited to demonstrate improved functional mobility.    Pt will report Right shoulder pain </= 0/10 with weight bearing and shoulder ER to demonstrate improved condition and ability to complete his ADLs, self care and return to the gym.  MET  Pt will improve MMT of Right RTC to >/= 5/5 to improve tolerance for reaching overhead and completing his work related task.  MET  Pt will improve Right shoulder ROM = to uninvolved side to improve tolerance for reaching over head.  MET  Pt goal:  Learn how to manage shoulder and have better motion; learn better ways to strengthen; avoid surgery.     PLAN     Develop periscapular strength and RTC strength    George Castro, PT, DPT

## 2024-10-28 PROBLEM — Z00.00 PREVENTATIVE HEALTH CARE: Status: RESOLVED | Noted: 2024-07-29 | Resolved: 2024-10-28

## 2024-10-30 ENCOUNTER — LAB VISIT (OUTPATIENT)
Dept: LAB | Facility: HOSPITAL | Age: 39
End: 2024-10-30
Attending: FAMILY MEDICINE
Payer: MEDICAID

## 2024-10-30 DIAGNOSIS — Z00.00 PREVENTATIVE HEALTH CARE: ICD-10-CM

## 2024-10-30 DIAGNOSIS — N20.0 KIDNEY STONES, CALCIUM OXALATE: ICD-10-CM

## 2024-10-30 LAB
25(OH)D3+25(OH)D2 SERPL-MCNC: 27 NG/ML (ref 30–96)
ALBUMIN SERPL BCP-MCNC: 4 G/DL (ref 3.5–5.2)
ALP SERPL-CCNC: 53 U/L (ref 40–150)
ALT SERPL W/O P-5'-P-CCNC: 17 U/L (ref 10–44)
ANION GAP SERPL CALC-SCNC: 8 MMOL/L (ref 8–16)
AST SERPL-CCNC: 21 U/L (ref 10–40)
BILIRUB SERPL-MCNC: 1.1 MG/DL (ref 0.1–1)
BUN SERPL-MCNC: 12 MG/DL (ref 6–20)
CALCIUM SERPL-MCNC: 9.4 MG/DL (ref 8.7–10.5)
CHLORIDE SERPL-SCNC: 104 MMOL/L (ref 95–110)
CHOLEST SERPL-MCNC: 184 MG/DL (ref 120–199)
CHOLEST/HDLC SERPL: 2.6 {RATIO} (ref 2–5)
CO2 SERPL-SCNC: 26 MMOL/L (ref 23–29)
CREAT SERPL-MCNC: 1 MG/DL (ref 0.5–1.4)
EST. GFR  (NO RACE VARIABLE): >60 ML/MIN/1.73 M^2
ESTIMATED AVG GLUCOSE: 88 MG/DL (ref 68–131)
GLUCOSE SERPL-MCNC: 88 MG/DL (ref 70–110)
HBA1C MFR BLD: 4.7 % (ref 4–5.6)
HDLC SERPL-MCNC: 72 MG/DL (ref 40–75)
HDLC SERPL: 39.1 % (ref 20–50)
LDLC SERPL CALC-MCNC: 98.4 MG/DL (ref 63–159)
NONHDLC SERPL-MCNC: 112 MG/DL
POTASSIUM SERPL-SCNC: 4.4 MMOL/L (ref 3.5–5.1)
PROT SERPL-MCNC: 7.2 G/DL (ref 6–8.4)
SODIUM SERPL-SCNC: 138 MMOL/L (ref 136–145)
TRIGL SERPL-MCNC: 68 MG/DL (ref 30–150)

## 2024-10-30 PROCEDURE — 80061 LIPID PANEL: CPT | Performed by: FAMILY MEDICINE

## 2024-10-30 PROCEDURE — 83036 HEMOGLOBIN GLYCOSYLATED A1C: CPT | Performed by: FAMILY MEDICINE

## 2024-10-30 PROCEDURE — 82306 VITAMIN D 25 HYDROXY: CPT | Performed by: FAMILY MEDICINE

## 2024-10-30 PROCEDURE — 80053 COMPREHEN METABOLIC PANEL: CPT | Performed by: FAMILY MEDICINE

## 2024-10-30 PROCEDURE — 36415 COLL VENOUS BLD VENIPUNCTURE: CPT | Performed by: FAMILY MEDICINE

## 2024-10-31 ENCOUNTER — TELEPHONE (OUTPATIENT)
Dept: FAMILY MEDICINE | Facility: CLINIC | Age: 39
End: 2024-10-31
Payer: MEDICAID

## 2024-10-31 DIAGNOSIS — E55.9 VITAMIN D DEFICIENCY: Primary | ICD-10-CM

## 2024-10-31 RX ORDER — ERGOCALCIFEROL 1.25 MG/1
50000 CAPSULE ORAL
Qty: 4 CAPSULE | Refills: 11 | Status: SHIPPED | OUTPATIENT
Start: 2024-10-31 | End: 2025-10-31

## 2024-10-31 NOTE — PROGRESS NOTES
Vitamin-D level is low.  We will initiate vitamin-D 04527 units per week, and recheck vitamin-D in 3 months.  I will send in orders.

## 2024-11-06 ENCOUNTER — OFFICE VISIT (OUTPATIENT)
Dept: FAMILY MEDICINE | Facility: CLINIC | Age: 39
End: 2024-11-06
Payer: MEDICAID

## 2024-11-06 VITALS
WEIGHT: 189 LBS | SYSTOLIC BLOOD PRESSURE: 118 MMHG | BODY MASS INDEX: 27.99 KG/M2 | OXYGEN SATURATION: 98 % | HEIGHT: 69 IN | DIASTOLIC BLOOD PRESSURE: 78 MMHG | TEMPERATURE: 99 F | RESPIRATION RATE: 18 BRPM | HEART RATE: 67 BPM

## 2024-11-06 DIAGNOSIS — M54.2 MYOFASCIAL NECK PAIN: Primary | ICD-10-CM

## 2024-11-06 DIAGNOSIS — E55.9 VITAMIN D DEFICIENCY: ICD-10-CM

## 2024-11-06 DIAGNOSIS — R29.898 WEAKNESS OF RIGHT SHOULDER: ICD-10-CM

## 2024-11-06 PROCEDURE — 99999 PR PBB SHADOW E&M-EST. PATIENT-LVL III: CPT | Mod: PBBFAC,,, | Performed by: FAMILY MEDICINE

## 2024-11-06 PROCEDURE — 3078F DIAST BP <80 MM HG: CPT | Mod: CPTII,,, | Performed by: FAMILY MEDICINE

## 2024-11-06 PROCEDURE — 1159F MED LIST DOCD IN RCRD: CPT | Mod: CPTII,,, | Performed by: FAMILY MEDICINE

## 2024-11-06 PROCEDURE — 3008F BODY MASS INDEX DOCD: CPT | Mod: CPTII,,, | Performed by: FAMILY MEDICINE

## 2024-11-06 PROCEDURE — 3074F SYST BP LT 130 MM HG: CPT | Mod: CPTII,,, | Performed by: FAMILY MEDICINE

## 2024-11-06 PROCEDURE — 99213 OFFICE O/P EST LOW 20 MIN: CPT | Mod: PBBFAC,PN | Performed by: FAMILY MEDICINE

## 2024-11-06 PROCEDURE — 3044F HG A1C LEVEL LT 7.0%: CPT | Mod: CPTII,,, | Performed by: FAMILY MEDICINE

## 2024-11-06 PROCEDURE — 99213 OFFICE O/P EST LOW 20 MIN: CPT | Mod: S$PBB,,, | Performed by: FAMILY MEDICINE

## 2024-11-06 RX ORDER — ERGOCALCIFEROL 1.25 MG/1
50000 CAPSULE ORAL
Qty: 4 CAPSULE | Refills: 11 | Status: SHIPPED | OUTPATIENT
Start: 2024-11-06 | End: 2025-11-06

## 2024-11-06 NOTE — PROGRESS NOTES
"Subjective:       Patient ID: Bonifacio Bower is a 39 y.o. male.    Chief Complaint: rotator cuff tendinitis      History of Present Illness    CHIEF COMPLAINT:  Mr. Bower presents for a three-month follow-up visit to review lab results and discuss ongoing shoulder and neck pain.    HPI:  Mr. Bower reports ongoing issues with his right shoulder and neck area. His rotator cuff problem has improved with resistance band exercises from previous physical therapy. He now has pain between his shoulder and neck, specifically in the low cervical C5 to T1 area, extending down his back to about the mid-back region and sometimes radiating to his elbow. The pain is constant throughout the day.    2 weeks ago, he consulted a new chiropractor who performed an x-ray and diagnosed him with "phase one" of an unspecified condition. The chiropractor recommended twice-weekly treatments, which the patient could not afford due to insurance coverage issues.    Mr. Bower works as a local  but is not currently driving. 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    Mr. Bower declines muscle relaxers or pain medication.    MEDICATIONS:  Mr. Bower has been prescribed Vitamin D to be taken weekly, but has not yet started the medication.    MEDICAL HISTORY:  Mr. Bower has a history of a rotator cuff issue in his right shoulder.    FAMILY HISTORY:  Family history is significant for father who has undergone three neck surgeries.    TEST RESULTS:  Mr. Bower's cholesterol level was 184 on October 30th, which is considered good. His Vitamin D level was low on October 31st. Kidney and liver function tests performed on October 30th showed good results. The CBC and glucose tests conducted on October 30th were normal.    IMAGING:  Mr. Bower underwent an X-ray two weeks ago, performed by a new chiropractor. The results of this X-ray " were not specified.    SOCIAL HISTORY:  Mr. Bower works as a  but is currently not driving.      ROS:  Musculoskeletal: +joint pain, +muscle pain          Allergies and Medications:   Review of patient's allergies indicates:  No Known Allergies  Current Outpatient Medications   Medication Sig Dispense Refill    ergocalciferol (ERGOCALCIFEROL) 50,000 unit Cap Take 1 capsule (50,000 Units total) by mouth every 7 days. 4 capsule 11     No current facility-administered medications for this visit.       Family History:   No family history on file.    Social History:   Social History     Socioeconomic History    Marital status:    Tobacco Use    Smoking status: Never    Smokeless tobacco: Never   Substance and Sexual Activity    Alcohol use: Yes     Comment: occasionally    Drug use: No    Sexual activity: Yes     Partners: Female     Social Drivers of Health     Financial Resource Strain: Low Risk  (7/28/2024)    Overall Financial Resource Strain (CARDIA)     Difficulty of Paying Living Expenses: Not very hard   Food Insecurity: No Food Insecurity (7/28/2024)    Hunger Vital Sign     Worried About Running Out of Food in the Last Year: Never true     Ran Out of Food in the Last Year: Never true   Transportation Needs: No Transportation Needs (3/29/2023)    PRAPARE - Transportation     Lack of Transportation (Medical): No     Lack of Transportation (Non-Medical): No   Physical Activity: Insufficiently Active (7/28/2024)    Exercise Vital Sign     Days of Exercise per Week: 2 days     Minutes of Exercise per Session: 20 min   Stress: Stress Concern Present (7/28/2024)    [employer] [place] [employer] - Occupational Stress Questionnaire     Feeling of Stress : To some extent   Housing Stability: Unknown (3/29/2023)    Housing Stability Vital Sign     Unable to Pay for Housing in the Last Year: No     Unstable Housing in the Last Year: No           Objective:     Vitals:    11/06/24 1549   BP:  118/78   Pulse: 67   Resp: 18   Temp: 98.7 °F (37.1 °C)        Physical Exam    General: No acute distress. Well-developed. Well-nourished.  Eyes: EOMI. Sclerae anicteric.  HENT: Normocephalic. Atraumatic. Nares patent. Moist oral mucosa.  Cardiovascular: Regular rate. Regular rhythm. No murmurs. No rubs. No gallops. Normal S1, S2.  Respiratory: Normal respiratory effort. Clear to auscultation bilaterally. No rales. No rhonchi. No wheezing.  Musculoskeletal: No  obvious deformity. Palpable trigger points in the right trapezius. Palpable trigger points in the interspinous muscles.  Extremities: No lower extremity edema.  Neurological: Alert & oriented x3. No slurred speech. Normal gait.  Psychiatric: Normal mood. Normal affect. Good insight. Good judgment.  Skin: Warm. Dry. No rash.  MSK: Shoulder - Right: Full range of motion.            Assessment:       1. Myofascial neck pain    2. Vitamin D deficiency    3. Weakness of right shoulder        Plan:       Assessment & Plan    Evaluated patient's right shoulder and neck pain  Assessed condition as myofascial pain rather than full-blown fibromyalgia  Determined physical therapy appropriate for treatment, preferring conservative management over surgical intervention  Considered potential for nerve impingement due to muscle tightness    MYOFASCIAL NECK PAIN:  - Ordered physical therapy for myofascial neck pain.  - Referred to physical therapy at [facility] for myofascial neck pain.    SHOULDER CONDITION:  - Explained that muscle tightness can cause nerve impingement, and relaxing muscles should help alleviate symptoms.  - Discussed importance of home stretching program for shoulders to prevent tightness and freezing.    VITAMIN D DEFICIENCY:  - Started vitamin D replacement, prescription sent to [facility] [address].    FOLLOW UP:  - Follow up in 3 months.        Bonifacio was seen today for rotator cuff tendinitis.    Diagnoses and all orders for this  "visit:    Myofascial neck pain  -     Ambulatory referral/consult to Physical/Occupational Therapy; Future    Vitamin D deficiency  -     ergocalciferol (ERGOCALCIFEROL) 50,000 unit Cap; Take 1 capsule (50,000 Units total) by mouth every 7 days.    Weakness of right shoulder         Follow up in about 3 months (around 2/6/2025).  This note was generated with the assistance of ambient listening technology. Verbal consent was obtained by the patient and accompanying visitor(s) for the recording of patient appointment to facilitate this note. I attest to having reviewed and edited the generated note for accuracy, though some syntax or spelling errors may persist. Please contact the author of this note for any clarification.   Subjective:       Patient ID: Bonifacio Bower is a 39 y.o. male.  Lab Results   Component Value Date    WBC 9.20 05/04/2024    HGB 14.6 05/04/2024    HCT 41.2 05/04/2024     05/04/2024    CHOL 184 10/30/2024    TRIG 68 10/30/2024    HDL 72 10/30/2024    ALT 17 10/30/2024    AST 21 10/30/2024     10/30/2024    K 4.4 10/30/2024     10/30/2024    CREATININE 1.0 10/30/2024    BUN 12 10/30/2024    CO2 26 10/30/2024    HGBA1C 4.7 10/30/2024       Chief Complaint: rotator cuff tendinitis      History of Present Illness    CHIEF COMPLAINT:  Mr. Bower presents for a three-month follow-up visit to review lab results and discuss ongoing shoulder and neck pain.    HPI:  Mr. Bower reports ongoing issues with his right shoulder and neck area. His rotator cuff problem has improved with resistance band exercises from previous physical therapy. He now has pain between his shoulder and neck, specifically in the low cervical C5 to T1 area, extending down his back to about the mid-back region and sometimes radiating to his elbow. The pain is constant throughout the day.    2 weeks ago, he consulted a new chiropractor who performed an x-ray and diagnosed him with "phase one" of an unspecified " condition. The chiropractor recommended twice-weekly treatments, which the patient could not afford due to insurance coverage issues.    Mr. Bower works as a local  but is not currently driving. 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    Mr. Bower declines muscle relaxers or pain medication.    MEDICATIONS:  Mr. Bower has been prescribed Vitamin D to be taken weekly, but has not yet started the medication.    MEDICAL HISTORY:  Mr. Bower has a history of a rotator cuff issue in his right shoulder.    FAMILY HISTORY:  Family history is significant for father who has undergone three neck surgeries.    TEST RESULTS:  Mr. Bower's cholesterol level was 184 on October 30th, which is considered good. His Vitamin D level was low on October 31st. Kidney and liver function tests performed on October 30th showed good results. The CBC and glucose tests conducted on October 30th were normal.    IMAGING:  Mr. Bower underwent an X-ray two weeks ago, performed by a new chiropractor. The results of this X-ray were not specified.    SOCIAL HISTORY:  Mr. Bower works as a  but is currently not driving.      ROS:  Musculoskeletal: +joint pain, +muscle pain          Allergies and Medications:   Review of patient's allergies indicates:  No Known Allergies  Current Outpatient Medications   Medication Sig Dispense Refill    ergocalciferol (ERGOCALCIFEROL) 50,000 unit Cap Take 1 capsule (50,000 Units total) by mouth every 7 days. 4 capsule 11     No current facility-administered medications for this visit.       Family History:   No family history on file.    Social History:   Social History     Socioeconomic History    Marital status:    Tobacco Use    Smoking status: Never    Smokeless tobacco: Never   Substance and Sexual Activity    Alcohol use: Yes     Comment: occasionally    Drug use: No     Sexual activity: Yes     Partners: Female     Social Drivers of Health     Financial Resource Strain: Low Risk  (7/28/2024)    Overall Financial Resource Strain (CARDIA)     Difficulty of Paying Living Expenses: Not very hard   Food Insecurity: No Food Insecurity (7/28/2024)    Hunger Vital Sign     Worried About Running Out of Food in the Last Year: Never true     Ran Out of Food in the Last Year: Never true   Transportation Needs: No Transportation Needs (3/29/2023)    PRAPARE - Transportation     Lack of Transportation (Medical): No     Lack of Transportation (Non-Medical): No   Physical Activity: Insufficiently Active (7/28/2024)    Exercise Vital Sign     Days of Exercise per Week: 2 days     Minutes of Exercise per Session: 20 min   Stress: Stress Concern Present (7/28/2024)    [employer] [place] [employer] - Occupational Stress Questionnaire     Feeling of Stress : To some extent   Housing Stability: Unknown (3/29/2023)    Housing Stability Vital Sign     Unable to Pay for Housing in the Last Year: No     Unstable Housing in the Last Year: No           Objective:     Vitals:    11/06/24 1549   BP: 118/78   Pulse: 67   Resp: 18   Temp: 98.7 °F (37.1 °C)        Physical Exam    General: No acute distress. Well-developed. Well-nourished.  Eyes: EOMI. Sclerae anicteric.  HENT: Normocephalic. Atraumatic. Nares patent. Moist oral mucosa.  Cardiovascular: Regular rate. Regular rhythm. No murmurs. No rubs. No gallops. Normal S1, S2.  Respiratory: Normal respiratory effort. Clear to auscultation bilaterally. No rales. No rhonchi. No wheezing.  Musculoskeletal: No  obvious deformity. Palpable trigger points in the right trapezius. Palpable trigger points in the interspinous muscles.  Extremities: No lower extremity edema.  Neurological: Alert & oriented x3. No slurred speech. Normal gait.  Psychiatric: Normal mood. Normal affect. Good insight. Good judgment.  Skin: Warm. Dry. No rash.  MSK: Shoulder - Right:  Full range of motion.            Assessment:       1. Myofascial neck pain    2. Vitamin D deficiency    3. Weakness of right shoulder        Plan:       Assessment & Plan    Evaluated patient's right shoulder and neck pain  Assessed condition as myofascial pain rather than full-blown fibromyalgia  Determined physical therapy appropriate for treatment, preferring conservative management over surgical intervention  Considered potential for nerve impingement due to muscle tightness    MYOFASCIAL NECK PAIN:  - Ordered physical therapy for myofascial neck pain.  - Referred to physical therapy at [facility] for myofascial neck pain.    SHOULDER CONDITION:  - Explained that muscle tightness can cause nerve impingement, and relaxing muscles should help alleviate symptoms.  - Discussed importance of home stretching program for shoulders to prevent tightness and freezing.    VITAMIN D DEFICIENCY:  - Started vitamin D replacement, prescription sent to [facility] on [address].    FOLLOW UP:  - Follow up in 3 months.        Bonifacio was seen today for rotator cuff tendinitis.    Diagnoses and all orders for this visit:    Myofascial neck pain  -     Ambulatory referral/consult to Physical/Occupational Therapy; Future    Vitamin D deficiency  -     ergocalciferol (ERGOCALCIFEROL) 50,000 unit Cap; Take 1 capsule (50,000 Units total) by mouth every 7 days.    Weakness of right shoulder         Follow up in about 3 months (around 2/6/2025).  This note was generated with the assistance of ambient listening technology. Verbal consent was obtained by the patient and accompanying visitor(s) for the recording of patient appointment to facilitate this note. I attest to having reviewed and edited the generated note for accuracy, though some syntax or spelling errors may persist. Please contact the author of this note for any clarification.

## 2024-11-11 ENCOUNTER — CLINICAL SUPPORT (OUTPATIENT)
Dept: REHABILITATION | Facility: HOSPITAL | Age: 39
End: 2024-11-11
Payer: MEDICAID

## 2024-11-11 DIAGNOSIS — M54.2 MYOFASCIAL NECK PAIN: ICD-10-CM

## 2024-11-11 DIAGNOSIS — R29.898 WEAKNESS OF BOTH SHOULDERS: ICD-10-CM

## 2024-11-11 DIAGNOSIS — M53.84 DECREASED ROM OF THORACIC SPINE: Primary | ICD-10-CM

## 2024-11-11 PROCEDURE — 97161 PT EVAL LOW COMPLEX 20 MIN: CPT | Mod: PN

## 2024-11-15 NOTE — PLAN OF CARE
[facility]   Physical Therapy Initial Evaluation      Name: Bonifacio Bower  Clinic Number: [id number]    Therapy Diagnosis:   Encounter Diagnoses   Name Primary?    Myofascial neck pain     Decreased ROM of thoracic spine Yes    Weakness of both shoulders         Physician: Jeffery Bruce MD    Physician Orders: PT Eval and Treat   Medical Diagnosis from Referral: M54.2 (ICD-10-CM) - Myofascial neck pain   Evaluation Date: 11/11/2024  Authorization Period Expiration: 11/06/2025  Plan of Care Expiration: 12/23/2024  Progress Note Due: 12/11/2024  Date of Surgery: N/A  Visit # / Visits authorized: 1/ 1   FOTO: 1/ 3    Precautions: Standard     Time In: 3:00  Time Out: 3:52  Total Billable Time: 52 minutes    Subjective     Date of onset: 4-5 weeks ago     History of current condition - Bonifacio reports: He went to the [facility] and they stated he was in phase 1 a non specific condition and was not interested in paying 2200 for the 6 weeks. When he drives, when he does anything flexion based. IT is at the medial border of his scapula and Left side of his neck    Falls: none     Imaging: none:     Prior Therapy: none   Social History:  lives with their spouse  Occupation: Video graphy and photography   Prior Level of Function: independent with all ADLs, self care and leisurely task  Current Level of Function: as above     Pain:  Current 6/10, worst 8/10, best 0/10   Location: left cervical spine and scapula    Description: constant and annoying   Aggravating Factors:  Left quadrant, flexion and   Easing Factors:  laying down and stretching the area     Patients goals: Get rid of this constant pain and better daily practices      Medical History:   No past medical history on file.    Surgical History:   Bonifacio Bower  has a past surgical history that includes Fracture surgery.    Medications:   Bonifacio has a current medication list which includes the following prescription(s):  ergocalciferol.    Allergies:   Review of patient's allergies indicates:  No Known Allergies     Objective      Observation: depressed shoulders B, internal rotation in BUE, protracted shoulder, downward rotated scapula on the Left     Posture: as above     Cervical Range of Motion:    Degrees Pain Normal   Flexion 40 Y   80-90 deg   Extension 40 Y   normal ROM: 70-80 deg   Right Rotation 60 Y   70-90 degrees   Left Rotation 60 N   70-90 degrees   Right Side Bending 45 N 20-45 degrees   Left Side Bending 30 Y 20-45 degrees      Shoulder Range of Motion:   Shoulder Left Right   Flexion 180 180   Abduction 180 180   ER 90 90   IR 55 55     Upper Extremity Strength  (R) UE  (L) UE    Shoulder flexion: 5/5 Shoulder flexion: 5/5   Shoulder Abduction: 5/5 Shoulder abduction: 5/5   Shoulder ER 5/5 Shoulder ER 5/5   Shoulder IR 5/5 Shoulder IR 5/5   Elbow flexion: 5/5 Elbow flexion: 5/5   Elbow extension: 5/5 Elbow extension: 5/5   Wrist flexion: 5/5 Wrist flexion: 5/5   Wrist extension: 5/5 Wrist extension: 5/5   Serratus Anterior 4-/5 Serratus Anterior  4-/5   Lower Trap 3-/5 Lower Trap 3-/5   Middle Trap 3-/5 Middle Trap 3-/5       Special Tests:  Distraction -   Spurlings -   Vertebral Artery -   Jeff -   Lateral Flexion Alar Ligament -   Transverse Ligament -   Quadrant Testing -     Cervical joint mobility:    C0-C1 Flex/Ext 15 Flexion: 5 degrees  Extension: 10 degrees   Total: 15 degrees   C0-C1 Sidebending 5 5 degrees    C1-2 Rotation 35 35-40 degrees   C1-3 Rotation 50 60 degrees    Side glides  normal Hyper/Normal/Hypo         Reflexes:  -Bicep (C5): 2+  -Brachioradialis (C6): 2+  -Tricep (C7): 2+    Thoracic mobility: decreased thoracic spine PA noted    Palpation: no tenderness to palpation noted      Sensation: intact to light touch in BUE     Flexibility: +muscle length test for levator scapulae on the Left     Intake Outcome Measure for FOTO Cervical Survey    Therapist reviewed FOTO scores for Bonifacio BE  Sathish on 11/11/2024.   FOTO report - see Media section or FOTO account episode details.    Intake Score: 57%         Treatment     Total Treatment time (time-based codes) separate from Evaluation: 14 minutes     Bonifacio received the treatments listed below:      therapeutic activities to improve functional performance for 14  minutes, including:  POC education  HEP education:  Upper trap level 2, 2 x 10, 5 second hold  No money with shoulder shrug, green theraband, x10    Patient Education and Home Exercises     Education provided:   - HEP education  - POC education     Written Home Exercises Provided: Yes. Exercises were reviewed and Bonifacio was able to demonstrate them prior to the end of the session.  Bonifacio demonstrated good  understanding of the education provided. See EMR under Patient Instructions for exercises provided during therapy sessions.    Assessment     Bonifacio is a 39 y.o. male referred to outpatient Physical Therapy with a medical diagnosis of M54.2 (ICD-10-CM) - Myofascial neck pain. Patient presents increased muscle length at his levator scapulae due to weak periscapular muscles, depressed sohulder and rounded shoulders placing incresaed load on cerical spine. He currently does not have any limitations, but notes that he pain is limiting to his overall QOL. Bonifacio makes a good candidate for skilled Physical Therapy to improve the above listed deficits.     Patient prognosis is Excellent.   Patient will benefit from skilled outpatient Physical Therapy to address the deficits stated above and in the chart below, provide patient /family education, and to maximize patientt's level of independence.     Plan of care discussed with patient: Yes  Patient's spiritual, cultural and educational needs considered and patient is agreeable to the plan of care and goals as stated below:     Anticipated Barriers for therapy: none noted    Medical Necessity is demonstrated by the  following  History  Co-morbidities and personal factors that may impact the plan of care [x] LOW: no personal factors / co-morbidities  [] MODERATE: 1-2 personal factors / co-morbidities  [] HIGH: 3+ personal factors / co-morbidities    Moderate / High Support Documentation:   Co-morbidities affecting plan of care:     Personal Factors:   no deficits     Examination  Body Structures and Functions, activity limitations and participation restrictions that may impact the plan of care [x] LOW: addressing 1-2 elements  [] MODERATE: 3+ elements  [] HIGH: 4+ elements (please support below)    Moderate / High Support Documentation:      Clinical Presentation [x] LOW: stable  [] MODERATE: Evolving  [] HIGH: Unstable     Decision Making/ Complexity Score: low       Goals:  Short Term Goals: 3 weeks. Pt agrees with goals set.  Pt will demonstrate independence and compliance with initial HEP to improve independence and symptom management.   Pt will report shoulder and neck pain </= 3/10  with cervical range of motion and shoulder range of motion to demonstrate improved condition and ability to return to his PLOF.    Pt will improve MMT of scapular upward rotators to >/= 3+/5 to improve tolerance for overall posture and decrease the demand on his periscapular muscles.     Pt will demonstrate symmetrical and levator scapulae muscle length test      Long Term Goals: 6 weeks. Pt agrees with goals set.   Pt will demonstrate independence and compliance with final HEP to continue managing symptoms and developing mobility, motor control and strength.    Pt will improve FOTO score to </= 57% limited to demonstrate improved functional mobility.    Pt will report shoulder and neck pain </= 1/10  with cervical range of motion and shoulder range of motion to demonstrate improved condition and ability to return to his PLOF.    Pt will improve MMT of scapular upward rotators to >/= 4-/5 to improve tolerance for overall posture and decrease  the demand on his periscapular muscles.    Pt will demonstrate symmetrical and pain free levator scapulae muscle length test   Pt goal:  Get rid of this constant pain and better daily practices    Plan     Plan of care Certification: 11/11/2024 to 12/23/2024.    Outpatient Physical Therapy 2 times weekly for 6 weeks to include the following interventions: Cervical/Lumbar Traction, Electrical Stimulation NMES, Manual Therapy, Moist Heat/ Ice, Neuromuscular Re-ed, Patient Education, Self Care, Therapeutic Activities, Therapeutic Exercise, and dry needling .     George Castro, PT, DPT        Physician's Signature: _________________________________________ Date: ________________

## 2025-02-17 ENCOUNTER — TELEPHONE (OUTPATIENT)
Dept: FAMILY MEDICINE | Facility: CLINIC | Age: 40
End: 2025-02-17
Payer: MEDICAID

## 2025-02-17 DIAGNOSIS — M54.2 MYOFASCIAL NECK PAIN: Primary | ICD-10-CM

## 2025-02-17 NOTE — TELEPHONE ENCOUNTER
Patient wants to get therapy again because he is not in so much pain as he was formerly he will be able to do the exercises.

## 2025-02-21 ENCOUNTER — CLINICAL SUPPORT (OUTPATIENT)
Dept: REHABILITATION | Facility: HOSPITAL | Age: 40
End: 2025-02-21
Attending: FAMILY MEDICINE
Payer: MEDICAID

## 2025-02-21 DIAGNOSIS — R29.3 POOR POSTURE: Primary | ICD-10-CM

## 2025-02-21 DIAGNOSIS — M54.2 MYOFASCIAL NECK PAIN: ICD-10-CM

## 2025-02-21 DIAGNOSIS — M53.82 DECREASED RANGE OF MOTION OF INTERVERTEBRAL DISCS OF CERVICAL SPINE: ICD-10-CM

## 2025-02-21 PROCEDURE — 97161 PT EVAL LOW COMPLEX 20 MIN: CPT | Mod: PN

## 2025-02-21 PROCEDURE — 97110 THERAPEUTIC EXERCISES: CPT | Mod: PN

## 2025-02-21 NOTE — PROGRESS NOTES
Physical Therapy Evaluation    Patient Name: Bonifacio Bower  MRN: [id number]  YOB: 1985  Today's Date: 2/21/2025    Therapy Diagnosis:   Encounter Diagnoses   Name Primary?    Myofascial neck pain     Poor posture Yes    Decreased range of motion of intervertebral discs of cervical spine      Physician: Jeffery Bruce MD    Physician Orders: Eval and Treat  Medical Diagnosis:   M54.2 (ICD-10-CM) - Myofascial neck pain          Visit # / Visits Authorized:  1 / 1   Date of Evaluation:  2/21/2025   Insurance Authorization Period: 2/17/2025 to 2/17/2026  Plan of Care Certification:  2/21/2025 to 5/2/2025      Time In: 1005   Time Out: 1045  Total Time: 40   Total Billable Time: 40    Intake Outcome Measure for FOTO Survey    Therapist reviewed FOTO scores for Bonifacio Bower on 2/21/2025.   FOTO report - see Media section or FOTO account episode details.     Intake Score: 67%         Subjective   History of Present Illness  Bonifacio is a 39 y.o. male who reports to physical therapy with a chief concern of L side of neck into the L arm. According to the patient's chart, Bonifacio has no past medical history on file. Bonifacio has a past surgical history that includes Fracture surgery.                Dominant Hand: Right  History of Present Condition/Illness: Reports the pain on the L side of the neck started after he was having R rotator cuff issues. 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 Reports pain radiates to the elbows region behind the arm. Reports the pain seems to be starting in the neck the pain shoots into the L elbow region. Denies numbness and tingling. Reports sometimes it is constant on if he lays flat on the ground. Reports he started therapy for the R shoulder and it was going well. He used to always have problem on the L side.     Activities of Daily Living  Social history was obtained from Patient.    General  Prior Level of Function Comments: independent  General Current Level of Function Comments: sitting in positions for long time, he does video and photography       Previously independent with activities of daily living? Yes     Currently independent with activities of daily living? Yes          Previously independent with instrumental activities of daily living? Yes     Currently independent with instrumental activities of daily living? Yes              Pain     Patient reports a current pain level of 4/10. Pain at best is reported as 0/10. Pain at worst is reported as 8/10.   Location: L side neck and into the L arm  Clinical Progression (since onset): Stable  Pain Qualities: Pressure  Pain-Relieving Factors: Other (Comment)  Other Pain-Relieving Factors: sometimes taking - ibuprofen and another prescribe medicine,vitamin D pills, remove acidity and sugar from his diet some, sitting more upright  Pain-Aggravating Factors: Sitting, Driving         Review of Systems  Patient denies: Bladder Incontinence, Bowel Incontinence, Night Pain, and Weight Loss  Additional Red Flag Details: Hard to get comfortable at night      Treatment History  Treatments  Previously Received Treatments: Yes  Previous Treatments: Chiropractic  Currently Receiving Treatments: Yes  Current Treatments: Chiropractic  Additional Treatment Details: Chiropractor about 1 week ago. Now trying to do once a month. It helps for the neck and the back.     Living Arrangements  Living Situation  Housing: Home independently  Living Arrangements: Spouse/significant other, Family members    Home Setup  Type of Structure: House  Number of Levels in Home: One level        Employment  Patient reports: Does the patient's condition impact their ability to work?  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    Bonifacio Bower   has a past surgical history that includes Fracture surgery.    Bonifacio has a current medication list which includes the following prescription(s): ergocalciferol.    Review of patient's allergies indicates:  No Known Allergies     Objective   Posture                 Rounded shoulders, forward head posture    Subcranial Range of Motion   Active Restricted? Passive Restricted? Pain   Flexion         Protraction         Retraction           Seated Cervical range of motion: Flexion: None ; Extension: Minimal ; Right SB: Minimal ; Left SB: Minimal ; Left Rotation: Minimal ; Right rotation: Minimal. Pain in L elbow with neck flexion. Tightness reported in other directions.  Passive range of motion WNL, L elbow pain with L SB    Shoulder Range of Motion     Shoulder, Elbow, or Forearm Range of Motion Details: Apley Scratch testing WNL with flexion/ER and extension/IR; no pain and no limitations         Shoulder Strength - Planes of Motion   Right Strength Right Pain Left Strength Left  Pain   Flexion 5   5     Extension 5   5     ABduction 5   5     ADduction           Horizontal ABduction           Horizontal ADduction           Internal Rotation 0°           Internal Rotation 90°           External Rotation 0°           External Rotation 90°               Shoulder Strength - Scapular Stabilizing Muscles   Right Strength Right Pain Left Strength Left  Pain   Lower Trapezius 4+   4+     Middle Trapezius 4+   4+     Rhomboids 4+   4+                 Cervical/Thoracic Special Tests            Repeated chin retraction 10 repetitions, Repeated chin retraction extension 10 repetitions, no elbow pain with neck flexion, improved stiffness in all directions. Repeated chin retraction extension 10 repetitions, decrease better with L SB with L elbow pain.             Transfers Assessment  Sit to Stand Assistance: Independent  Chair to Bed Assistance: Independent  Bed to Chair Assistance: Independent    Bed Mobility  Assessment  Rolling Assistance: Independent  Sidelying to Sit Assistance: Independent  Sit to Sidelying Assistance: Independent  Scooting to Edge of Bed Assistance: Independent      Ambulation Details    Indepdent without AD    Gait Analysis  Gait Analysis Details  Normal gait mechanics/ no gait deficits noted         Treatment:  Therapeutic Activity  Therapeutic Activity 1: Education on HB program with visit 1 handout reviewed  Therapeutic Activity 2: Education on use of lumbar roll and information on how to purchase  Therapeutic Activity 3: Education on repeated movement testing and findings from evaluation    Assessment & Plan   Assessment  Bonifacio presents with a condition of Low complexity.   Presentation of Symptoms: Stable  Will Comorbidities Impact Care: No       Functional Limitations: Sitting tolerance, Driving, Other (Comment)  Other Functional Limitations: sleeping positions    Patient Goal for Therapy (PT): reduce L side neck pain  Prognosis: Good  Assessment Details:  Patient presents with c/o chronic L sided cervical pain without mechanism of injury. Patient presents with reported limitations including sitting tolerance, driving, and getting in a comfortable sleeping position. Patient demonstrates limited and painful cervical range of motion mostly with flexion and onset of elbow pain with flexion as well. She also demonstrates decreased B UE strength deficits and poor posture. Repeated cervical movement testing performed with baselines established stated above. Following repeated movements, there was improvement in baselines including L elbow pain with seated neck flexion and passive L SB. Based on subjective reports and improvement in baselines established following repeated movements, this indicates retraction/ extension directional preference as well as 1 REP pain pattern. Patient will benefit from skilled Physical Therapist services to address above deficits to improve functional mobility and  quality of life as well as return to PLOF without limitations.      Plan  From a physical therapy perspective, the patient would benefit from: Skilled Rehab Services    Planned therapy interventions include: Therapeutic exercise, Therapeutic activities, Neuromuscular re-education, Manual therapy, ADLs/IADLs, and Gait training.            Visit Frequency: 2 times Per Week for 6 Weeks.       This plan was discussed with Patient.   Discussion participants: Agreed Upon Plan of Care             Patient's spiritual, cultural, and educational needs considered and patient agreeable to plan of care and goals.     Education  Education was done with Patient. The patient's learning style includes Listening. The patient Verbalizes understanding.         HB visit 1, Repeated movements, Postural education        Goals:   Active       Long       - Pt will demonstrate independence with the HEP at discharge.        Start:  02/21/25    Expected End:  05/02/25               Long Term Goal       - Pt will demonstratte increased cervical MedX ROM as measured by med ex by 6 degrees from initial test which results in functional ROM of neck for ease with ADLs and driving.  Appropriate and Ongoing        Start:  02/21/25    Expected End:  05/02/25            - Pt will demonstrate increased MedX average isometric strength value  by 30% from initial test to improve ability to lift and carry, and sustain good posture while performing ADL's. Appropriate and Ongoing        Start:  02/21/25    Expected End:  05/02/25            - Pt will demonstrate reduced pain and improved functional outcomes as reported on the FOTO by reaching an intake score of >/= 71% functional ability in order to demonstrate subjective improvement in patient's condition. . Appropriate and Ongoing        Start:  02/21/25    Expected End:  05/02/25            - Pt will demonstrate independence with reducing or controlling symptoms with ther ex, movement, or position  independently, able to reduce pain 2-4 points on pain scale using strategies taught in therapy. Appropriate and Ongoing        Start:  02/21/25    Expected End:  05/02/25               Short Term Goal       - Pt will demonstratte increased cervical MedX ROM as measured by med ex by 3 degrees from initial test which results in improved  ROM of neck for ease with ADLs and driving. Appropriate and Ongoing        Start:  02/21/25    Expected End:  05/02/25            - Pt will demonstrate independence with reducing or controlling symptoms with ther ex, movement, or position independently, able to reduce pain 1-2 points on pain scale using strategies taught in therapy.  Appropriate and Ongoing        Start:  02/21/25    Expected End:  05/02/25            - Pt will demonstrate increased MedX average isometric strength value by 15% with  when compared to the initial testing resulting in improved ability to perform bending, lifting, and carrying activities safely and confidently. Appropriate and Ongoing        Start:  02/21/25    Expected End:  05/02/25                Sera Wyman, PT

## 2025-02-21 NOTE — PROGRESS NOTES
"Physical Therapy Visit    Patient Name: Bonifacio Bower  MRN: [id number]  YOB: 1985  Today's Date: 2/24/2025    Therapy Diagnosis:   Encounter Diagnoses   Name Primary?    Poor posture Yes    Decreased range of motion of intervertebral discs of cervical spine      Physician: Jeffery Bruce MD    Physician Orders: Eval and Treat    Medical Diagnosis:   M54.2 (ICD-10-CM) - Myofascial neck pain   Date of Evaluation:  2/21/2025   Insurance Authorization Period: 12/31/2025  Plan of Care Certification:  2/21/2025 to 5/2/2025   Visit # / Visits Authorized:  2/20                    Time In: 1058   Time Out: 1155  Total Time: 57   Total Billable Time:  57    FOTO:  Intake Score:  %  Survey Score 1:  %  Survey Score 2:  %         Subjective   Pt stated he always wakes up with a dull ache in his neck, shoulders, anddown his arms.  He stated it never goes away.  Pain reported as 6/10. neck, shoulders, upper back.    Objective            Treatment:  Therapeutic exercises   Upper Body Ergometer 2'/2'  Cervical range of motion flexion/extension, rotation, side bending x 15 reps   Chin tucks x 20 reps   Chin tucks with extension x 20 reps   Pronw "W" x 20 reps  Prone "Y" x 20 reps  Prone "I" x 20 reps   No monies red theraband x 20 reps   Wall slides with red theraband x 20 reps     Manual Therapy - 15 minutes  MWM, Soft Tissue Mobs, joint mobs, PA glides     Assessment & Plan   Assessment: Bonifacio presents to PT with pain in his neck and shoulders that he describes as an aggravation.  His pain is present when he wakes up every morning.  He is unable to sleep with a pillow.  Pt was educated to try using a small roll in the curve of his neck when he sleeps to prevent his neck from increasing the curvature.  He reported feeling much better after manual therapy.  He had more movement.  Evaluation/Treatment Tolerance: Patient tolerated treatment well    Patient will continue to benefit from skilled outpatient physical " therapy to address the deficits listed in the problem list box on initial evaluation, provide pt/family education and to maximize pt's level of independence in the home and community environment.     Patient's spiritual, cultural, and educational needs considered and patient agreeable to plan of care and goals.       Plan: Cont with therapeutic ex, therapeutic activities, and neuromuscular re-ed as tolerated to progress towards patients goals per POC.    Goals:   Active       Long       - Pt will demonstrate independence with the HEP at discharge.        Start:  02/21/25    Expected End:  05/02/25               Long Term Goal       - Pt will demonstratte increased cervical MedX ROM as measured by med ex by 6 degrees from initial test which results in functional ROM of neck for ease with ADLs and driving.  Appropriate and Ongoing        Start:  02/21/25    Expected End:  05/02/25            - Pt will demonstrate increased MedX average isometric strength value  by 30% from initial test to improve ability to lift and carry, and sustain good posture while performing ADL's. Appropriate and Ongoing        Start:  02/21/25    Expected End:  05/02/25            - Pt will demonstrate reduced pain and improved functional outcomes as reported on the FOTO by reaching an intake score of >/= 71% functional ability in order to demonstrate subjective improvement in patient's condition. . Appropriate and Ongoing        Start:  02/21/25    Expected End:  05/02/25            - Pt will demonstrate independence with reducing or controlling symptoms with ther ex, movement, or position independently, able to reduce pain 2-4 points on pain scale using strategies taught in therapy. Appropriate and Ongoing        Start:  02/21/25    Expected End:  05/02/25               Short Term Goal       - Pt will demonstratte increased cervical MedX ROM as measured by med ex by 3 degrees from initial test which results in improved  ROM of neck for ease  with ADLs and driving. Appropriate and Ongoing        Start:  02/21/25    Expected End:  05/02/25            - Pt will demonstrate independence with reducing or controlling symptoms with ther ex, movement, or position independently, able to reduce pain 1-2 points on pain scale using strategies taught in therapy.  Appropriate and Ongoing        Start:  02/21/25    Expected End:  05/02/25            - Pt will demonstrate increased MedX average isometric strength value by 15% with  when compared to the initial testing resulting in improved ability to perform bending, lifting, and carrying activities safely and confidently. Appropriate and Ongoing        Start:  02/21/25    Expected End:  05/02/25                Dora Raza, PTA

## 2025-02-24 ENCOUNTER — CLINICAL SUPPORT (OUTPATIENT)
Dept: REHABILITATION | Facility: HOSPITAL | Age: 40
End: 2025-02-24
Payer: MEDICAID

## 2025-02-24 ENCOUNTER — DOCUMENTATION ONLY (OUTPATIENT)
Dept: REHABILITATION | Facility: HOSPITAL | Age: 40
End: 2025-02-24

## 2025-02-24 DIAGNOSIS — R29.3 POOR POSTURE: Primary | ICD-10-CM

## 2025-02-24 DIAGNOSIS — M53.82 DECREASED RANGE OF MOTION OF INTERVERTEBRAL DISCS OF CERVICAL SPINE: ICD-10-CM

## 2025-02-24 PROCEDURE — 97110 THERAPEUTIC EXERCISES: CPT | Mod: PN,CQ

## 2025-02-24 NOTE — PROGRESS NOTES
PT/PTA met face to face to discuss pt's treatment plan and progress towards established goals. Pt will be seen by a physical therapist minimally every 6th visit or every 30 days.      Dora Raza PTA

## 2025-02-27 ENCOUNTER — CLINICAL SUPPORT (OUTPATIENT)
Dept: REHABILITATION | Facility: HOSPITAL | Age: 40
End: 2025-02-27
Payer: MEDICAID

## 2025-02-27 DIAGNOSIS — R29.3 POOR POSTURE: Primary | ICD-10-CM

## 2025-02-27 DIAGNOSIS — M53.82 DECREASED RANGE OF MOTION OF INTERVERTEBRAL DISCS OF CERVICAL SPINE: ICD-10-CM

## 2025-02-27 PROCEDURE — 97110 THERAPEUTIC EXERCISES: CPT | Mod: PN

## 2025-02-27 NOTE — PROGRESS NOTES
Physical Therapy Visit    Patient Name: Bonifacio Bower  MRN: [id number]  YOB: 1985  Encounter Date: 2/27/2025    Therapy Diagnosis:   Encounter Diagnoses   Name Primary?    Poor posture Yes    Decreased range of motion of intervertebral discs of cervical spine      Physician: Jeffery Bruce MD    Physician Orders: Eval and Treat    Medical Diagnosis:   M54.2 (ICD-10-CM) - Myofascial neck pain   Date of Evaluation:  2/21/2025   Insurance Authorization Period: 12/31/2025  Plan of Care Certification:  2/21/2025 to 5/2/2025   Visit # / Visits Authorized:  3/20                 Time In: 0902   Time Out: 1000  Total Time: 58   Total Billable Time: 58    FOTO:  Intake Score: 67%  Survey Score 1:  %  Survey Score 2:  %         Subjective   Reports 8/10 L sided neck pain upon arrival. He has been doing repeated extensions as well as flexion and levatro scap stretch for the L side. Reports he felt good after last tx session then the pain got worse the neck day and unable to get relief..  Pain reported as 8/10. L sided neck and arm    Objective            Treatment:  Therapeutic Exercise  Therapeutic Exercise Activity 1: UBE 2 min FWD and 2 min BWD  Therapeutic Exercise Activity 2: seated thoracic extension 20 reps  Therapeutic Exercise Activity 3: seated no money red TB 2 x 10  Therapeutic Exercise Activity 4: prone scapular retractions 20 reps  Therapeutic Exercise Activity 5: SL open books 15 reps each side  Therapeutic Exercise Activity 6: prone press up 10 reps, a little better in L sided back discomfort with L sided open book  Therapeutic Exercise Activity 7: MEdx testing  Therapeutic Exercise Activity 8: all peripheral machines         Therapeutic Activity  Therapeutic Activity 1: Repeated movements: baseline: pain flexion L SB and L rotation, and extension slightly but less than flexion  Therapeutic Activity 2: repeated chin retraction extension 15 reps, decrease better  Therapeutic Activity 3: Repeated  chin retraction 2 x 10 reps, decrease better, in all baselines, educated to continue with these at home and to avoid other exercises for now    Assessment & Plan   Assessment: Patient arrives with 8/10 pain. Pain was reduced to 5/10 pain following repeated chin retractions and extension. PT educated patient on this response and to focus on these multiple times a day at home for symptoms reduction. He reported improvement in pain post tx session. He tolerated other exercises well without complaints of L sided neck pain. Medx testing performed demonstrating decreased flexion ROM and 8 % below norms.  Evaluation/Treatment Tolerance: Patient tolerated treatment well    Patient will continue to benefit from skilled outpatient physical therapy to address the deficits listed in the problem list box on initial evaluation, provide pt/family education and to maximize pt's level of independence in the home and community environment.     Patient's spiritual, cultural, and educational needs considered and patient agreeable to plan of care and goals.           Plan: Continue POC as indicated. Progress as tolerated.    Goals:   Active       Long       - Pt will demonstrate independence with the HEP at discharge.  (Progressing)       Start:  02/21/25    Expected End:  05/02/25               Long Term Goal       - Pt will demonstratte increased cervical MedX ROM as measured by med ex by 6 degrees from initial test which results in functional ROM of neck for ease with ADLs and driving.  Appropriate and Ongoing  (Progressing)       Start:  02/21/25    Expected End:  05/02/25            - Pt will demonstrate increased MedX average isometric strength value  by 30% from initial test to improve ability to lift and carry, and sustain good posture while performing ADL's. Appropriate and Ongoing  (Progressing)       Start:  02/21/25    Expected End:  05/02/25            - Pt will demonstrate reduced pain and improved functional outcomes as  reported on the FOTO by reaching an intake score of >/= 71% functional ability in order to demonstrate subjective improvement in patient's condition. . Appropriate and Ongoing  (Progressing)       Start:  02/21/25    Expected End:  05/02/25            - Pt will demonstrate independence with reducing or controlling symptoms with ther ex, movement, or position independently, able to reduce pain 2-4 points on pain scale using strategies taught in therapy. Appropriate and Ongoing  (Progressing)       Start:  02/21/25    Expected End:  05/02/25               Short Term Goal       - Pt will demonstratte increased cervical MedX ROM as measured by med ex by 3 degrees from initial test which results in improved  ROM of neck for ease with ADLs and driving. Appropriate and Ongoing  (Progressing)       Start:  02/21/25    Expected End:  05/02/25            - Pt will demonstrate independence with reducing or controlling symptoms with ther ex, movement, or position independently, able to reduce pain 1-2 points on pain scale using strategies taught in therapy.  Appropriate and Ongoing  (Progressing)       Start:  02/21/25    Expected End:  05/02/25            - Pt will demonstrate increased MedX average isometric strength value by 15% with  when compared to the initial testing resulting in improved ability to perform bending, lifting, and carrying activities safely and confidently. Appropriate and Ongoing  (Progressing)       Start:  02/21/25    Expected End:  05/02/25                Sera Wyman PT

## 2025-02-28 ENCOUNTER — TELEPHONE (OUTPATIENT)
Dept: FAMILY MEDICINE | Facility: CLINIC | Age: 40
End: 2025-02-28
Payer: MEDICAID

## 2025-03-03 ENCOUNTER — CLINICAL SUPPORT (OUTPATIENT)
Dept: REHABILITATION | Facility: HOSPITAL | Age: 40
End: 2025-03-03
Payer: MEDICAID

## 2025-03-03 DIAGNOSIS — R29.3 POOR POSTURE: Primary | ICD-10-CM

## 2025-03-03 DIAGNOSIS — M53.82 DECREASED RANGE OF MOTION OF INTERVERTEBRAL DISCS OF CERVICAL SPINE: ICD-10-CM

## 2025-03-03 PROCEDURE — 97110 THERAPEUTIC EXERCISES: CPT | Mod: PN

## 2025-03-03 NOTE — PROGRESS NOTES
Physical Therapy Visit    Patient Name: Bonifacio Bower  MRN: [id number]  YOB: 1985  Encounter Date: 3/3/2025    Therapy Diagnosis:   Encounter Diagnoses   Name Primary?    Poor posture Yes    Decreased range of motion of intervertebral discs of cervical spine      Physician: Jeffery Bruce MD    Physician Orders: Eval and Treat    Medical Diagnosis:   M54.2 (ICD-10-CM) - Myofascial neck pain   Date of Evaluation:  2/21/2025   Insurance Authorization Period: 12/31/2025  Plan of Care Certification:  2/21/2025 to 5/2/2025   Visit # / Visits Authorized:  4 /20          Time In: 0903   Time Out: 1000  Total Time: 57   Total Billable Time: 57    FOTO:  Intake Score: 67%  Survey Score 1:  %  Survey Score 2:  %         Subjective             Objective            Treatment:  Therapeutic Exercise  Therapeutic Exercise Activity 1: UBE 3 min FWD and 3 min BWD  Therapeutic Exercise Activity 2: seated thoracic extensions 20 reps  Therapeutic Exercise Activity 3: seated no money red TB 2 x 10  Therapeutic Exercise Activity 4: prone scapular retractions 15 reps  Therapeutic Exercise Activity 5: SL open books 10 reps each side  Therapeutic Exercise Activity 6: Medx  Therapeutic Exercise Activity 7: PNF D2 Flexion red TB 10 reps each UE  Therapeutic Exercise Activity 8: prone Y alternating 15 reps each UE  Therapeutic Exercise Activity 9: First three peripheral machines        3/3/2025    10:08 AM   [facility]   Visit Number 4   VAS Pain Rating 4   Cervical Weight 270 lbs   Repetitions 15   Rating of Perceived Exertion 4.5     Manual Therapy  Manual Therapy Activity 1: STM levator scap and middle trap  Manual Therapy Activity 2: PA mobs thoracic spine grade 3-4         Therapeutic Activity  Therapeutic Activity 1: Baseline: pain L SB and tightness L rotation  Therapeutic Activity 2: repeated chin retraction extension 15 reps, a little better  Therapeutic Activity 3: repeated chin retraction L SB 2 x 10,  onset of scapular pain after first set and then onset of L elbow tingling  Therapeutic Activity 4: repeated chin retraction extension 10 reps, decrease better in L elbow discomfort.    Assessment & Plan   Assessment: Arrives with 4/10 pain today. He has been doing his repeated movement with improvement. Baselines assessed with pain with L SB and tightness with L rotation. Slight improvement in baselines following repeated chin retraction extension. Added chin retraction L SB to treat L sided neck pain that continued following repeated chin retraction extension. However this lead to onset of pain L scapular region that reduced but then elbow pain started. Ceased this and returned to sagittal plane with improvement in elbow pain. He tolerated the rest of tx session well without complaints of pain. He tolerated manual well. Medx set at 80% peak torque with good tolerance at 4.5/10 RPE.  Evaluation/Treatment Tolerance: Patient tolerated treatment well    Patient will continue to benefit from skilled outpatient physical therapy to address the deficits listed in the problem list box on initial evaluation, provide pt/family education and to maximize pt's level of independence in the home and community environment.     Patient's spiritual, cultural, and educational needs considered and patient agreeable to plan of care and goals.           Plan:      Goals:   Active       Long       - Pt will demonstrate independence with the HEP at discharge.  (Progressing)       Start:  02/21/25    Expected End:  05/02/25               Long Term Goal       - Pt will demonstratte increased cervical MedX ROM as measured by med ex by 6 degrees from initial test which results in functional ROM of neck for ease with ADLs and driving.  Appropriate and Ongoing  (Progressing)       Start:  02/21/25    Expected End:  05/02/25            - Pt will demonstrate increased MedX average isometric strength value  by 30% from initial test to improve  ability to lift and carry, and sustain good posture while performing ADL's. Appropriate and Ongoing  (Progressing)       Start:  02/21/25    Expected End:  05/02/25            - Pt will demonstrate reduced pain and improved functional outcomes as reported on the FOTO by reaching an intake score of >/= 71% functional ability in order to demonstrate subjective improvement in patient's condition. . Appropriate and Ongoing  (Progressing)       Start:  02/21/25    Expected End:  05/02/25            - Pt will demonstrate independence with reducing or controlling symptoms with ther ex, movement, or position independently, able to reduce pain 2-4 points on pain scale using strategies taught in therapy. Appropriate and Ongoing  (Progressing)       Start:  02/21/25    Expected End:  05/02/25               Short Term Goal       - Pt will demonstratte increased cervical MedX ROM as measured by med ex by 3 degrees from initial test which results in improved  ROM of neck for ease with ADLs and driving. Appropriate and Ongoing  (Progressing)       Start:  02/21/25    Expected End:  05/02/25            - Pt will demonstrate independence with reducing or controlling symptoms with ther ex, movement, or position independently, able to reduce pain 1-2 points on pain scale using strategies taught in therapy.  Appropriate and Ongoing  (Progressing)       Start:  02/21/25    Expected End:  05/02/25            - Pt will demonstrate increased MedX average isometric strength value by 15% with  when compared to the initial testing resulting in improved ability to perform bending, lifting, and carrying activities safely and confidently. Appropriate and Ongoing  (Progressing)       Start:  02/21/25    Expected End:  05/02/25                Sera Wyman, PT

## 2025-03-05 NOTE — PROGRESS NOTES
Physical Therapy Visit    Patient Name: Bonifacio Bower  MRN: [id number]  YOB: 1985  Encounter Date: 3/6/2025    Therapy Diagnosis:   Encounter Diagnoses   Name Primary?    Poor posture Yes    Decreased range of motion of intervertebral discs of cervical spine        Physician: Jeffery Bruce MD    Physician Orders: Eval and Treat    Medical Diagnosis:   M54.2 (ICD-10-CM) - Myofascial neck pain   Date of Evaluation:  2/21/2025   Insurance Authorization Period: 12/31/2025  Plan of Care Certification:  2/21/2025 to 5/2/2025   Visit # / Visits Authorized:  5 /20          Time In: 1004   Time Out: 1059  Total Time: 55   Total Billable Time: 59    FOTO:  Intake Score:  %  Survey Score 1:  %  Survey Score 2:  %         Subjective   Pt reports his neck is feeling better.  He stated he feels an aching in his neck.  Pain reported as 3/10. L sided neck and arm    Objective            Treatment:   Therapeutic Exercise - 59 minutes  Medx      3/6/2025    11:00 AM   [facility]   Visit Number 5   Cervical Weight 270 lbs   Repetitions 20   Rating of Perceived Exertion 5      Upper Body Ergometer  3 min FWD and 3 min BWD  seated thoracic extensions 20 reps  seated no money red TB 2 x 10  Wall slides red theraband x 20 reps   prone scapular retractions 15 reps  SL open books 10 reps each side  PNF D2 Flexion red TB 10 reps each UE  prone Y alternating 15 reps each UE  All peripheral machines        Manual Therapy - NP today  Manual Therapy Activity 1: STM levator scap and middle trap  Manual Therapy Activity 2: PA mobs thoracic spine grade 3-4        Therapeutic Activity  Baseline: pain L SB and tightness L rotation  repeated chin retraction extension 2 x 10 reps  repeated chin retraction L SB 2 x 10 reps   repeated chin retraction extension 2 x 10 reps      Assessment & Plan   Assessment:         Patient will continue to benefit from skilled outpatient physical therapy to address the deficits listed in the  problem list box on initial evaluation, provide pt/family education and to maximize pt's level of independence in the home and community environment.     Patient's spiritual, cultural, and educational needs considered and patient agreeable to plan of care and goals.           Plan: Continue POC as indicated. Progress as tolerated.    Goals:   Active       Long       - Pt will demonstrate independence with the HEP at discharge.  (Progressing)       Start:  02/21/25    Expected End:  05/02/25               Long Term Goal       - Pt will demonstratte increased cervical MedX ROM as measured by med ex by 6 degrees from initial test which results in functional ROM of neck for ease with ADLs and driving.  Appropriate and Ongoing  (Progressing)       Start:  02/21/25    Expected End:  05/02/25            - Pt will demonstrate increased MedX average isometric strength value  by 30% from initial test to improve ability to lift and carry, and sustain good posture while performing ADL's. Appropriate and Ongoing  (Progressing)       Start:  02/21/25    Expected End:  05/02/25            - Pt will demonstrate reduced pain and improved functional outcomes as reported on the FOTO by reaching an intake score of >/= 71% functional ability in order to demonstrate subjective improvement in patient's condition. . Appropriate and Ongoing  (Progressing)       Start:  02/21/25    Expected End:  05/02/25            - Pt will demonstrate independence with reducing or controlling symptoms with ther ex, movement, or position independently, able to reduce pain 2-4 points on pain scale using strategies taught in therapy. Appropriate and Ongoing  (Progressing)       Start:  02/21/25    Expected End:  05/02/25               Short Term Goal       - Pt will demonstratte increased cervical MedX ROM as measured by med ex by 3 degrees from initial test which results in improved  ROM of neck for ease with ADLs and driving. Appropriate and Ongoing   (Progressing)       Start:  02/21/25    Expected End:  05/02/25            - Pt will demonstrate independence with reducing or controlling symptoms with ther ex, movement, or position independently, able to reduce pain 1-2 points on pain scale using strategies taught in therapy.  Appropriate and Ongoing  (Progressing)       Start:  02/21/25    Expected End:  05/02/25            - Pt will demonstrate increased MedX average isometric strength value by 15% with  when compared to the initial testing resulting in improved ability to perform bending, lifting, and carrying activities safely and confidently. Appropriate and Ongoing  (Progressing)       Start:  02/21/25    Expected End:  05/02/25                  Dora Raza, PTA

## 2025-03-06 ENCOUNTER — CLINICAL SUPPORT (OUTPATIENT)
Dept: REHABILITATION | Facility: HOSPITAL | Age: 40
End: 2025-03-06
Payer: MEDICAID

## 2025-03-06 DIAGNOSIS — R29.3 POOR POSTURE: Primary | ICD-10-CM

## 2025-03-06 DIAGNOSIS — M53.82 DECREASED RANGE OF MOTION OF INTERVERTEBRAL DISCS OF CERVICAL SPINE: ICD-10-CM

## 2025-03-06 PROCEDURE — 97110 THERAPEUTIC EXERCISES: CPT | Mod: PN,CQ

## 2025-03-18 ENCOUNTER — RESULTS FOLLOW-UP (OUTPATIENT)
Dept: FAMILY MEDICINE | Facility: CLINIC | Age: 40
End: 2025-03-18

## 2025-03-18 ENCOUNTER — OFFICE VISIT (OUTPATIENT)
Dept: FAMILY MEDICINE | Facility: CLINIC | Age: 40
End: 2025-03-18
Payer: MEDICAID

## 2025-03-18 ENCOUNTER — HOSPITAL ENCOUNTER (OUTPATIENT)
Dept: RADIOLOGY | Facility: HOSPITAL | Age: 40
Discharge: HOME | End: 2025-03-18
Attending: NURSE PRACTITIONER
Payer: MEDICAID

## 2025-03-18 VITALS
DIASTOLIC BLOOD PRESSURE: 82 MMHG | HEART RATE: 73 BPM | OXYGEN SATURATION: 99 % | BODY MASS INDEX: 26.66 KG/M2 | SYSTOLIC BLOOD PRESSURE: 126 MMHG | TEMPERATURE: 99 F | WEIGHT: 180.56 LBS

## 2025-03-18 DIAGNOSIS — M53.82 DECREASED RANGE OF MOTION OF INTERVERTEBRAL DISCS OF CERVICAL SPINE: ICD-10-CM

## 2025-03-18 DIAGNOSIS — M54.2 MYOFASCIAL NECK PAIN: ICD-10-CM

## 2025-03-18 DIAGNOSIS — M53.82 DECREASED RANGE OF MOTION OF INTERVERTEBRAL DISCS OF CERVICAL SPINE: Primary | ICD-10-CM

## 2025-03-18 PROCEDURE — 3008F BODY MASS INDEX DOCD: CPT | Mod: CPTII,,, | Performed by: NURSE PRACTITIONER

## 2025-03-18 PROCEDURE — 99213 OFFICE O/P EST LOW 20 MIN: CPT | Mod: PBBFAC,PN | Performed by: NURSE PRACTITIONER

## 2025-03-18 PROCEDURE — 3079F DIAST BP 80-89 MM HG: CPT | Mod: CPTII,,, | Performed by: NURSE PRACTITIONER

## 2025-03-18 PROCEDURE — 72050 X-RAY EXAM NECK SPINE 4/5VWS: CPT | Mod: TC

## 2025-03-18 PROCEDURE — 72050 X-RAY EXAM NECK SPINE 4/5VWS: CPT | Mod: 26,,, | Performed by: RADIOLOGY

## 2025-03-18 PROCEDURE — 1159F MED LIST DOCD IN RCRD: CPT | Mod: CPTII,,, | Performed by: NURSE PRACTITIONER

## 2025-03-18 PROCEDURE — 3074F SYST BP LT 130 MM HG: CPT | Mod: CPTII,,, | Performed by: NURSE PRACTITIONER

## 2025-03-18 PROCEDURE — 99999 PR PBB SHADOW E&M-EST. PATIENT-LVL III: CPT | Mod: PBBFAC,,, | Performed by: NURSE PRACTITIONER

## 2025-03-18 PROCEDURE — 99214 OFFICE O/P EST MOD 30 MIN: CPT | Mod: S$PBB,,, | Performed by: NURSE PRACTITIONER

## 2025-03-18 RX ORDER — CYCLOBENZAPRINE HCL 5 MG
10 TABLET ORAL NIGHTLY PRN
Qty: 60 TABLET | Refills: 0 | Status: SHIPPED | OUTPATIENT
Start: 2025-03-18 | End: 2025-04-17

## 2025-03-18 NOTE — PROGRESS NOTES
This dictation has been generated using Modal Fluency Dictation some phonetic errors may occur. Please contact author for clarification if needed.     1. Decreased range of motion of intervertebral discs of cervical spine  -     X-Ray Cervical Spine Complete 5 view; Future; Expected date: 03/18/2025    2. Myofascial neck pain  -     X-Ray Cervical Spine Complete 5 view; Future; Expected date: 03/18/2025    Other orders  -     cyclobenzaprine (FLEXERIL) 5 MG tablet; Take 2 tablets (10 mg total) by mouth nightly as needed for Muscle spasms.  Dispense: 60 tablet; Refill: 0       Assessment & Plan    IMPRESSION:  - Assessed shoulder and neck pain, noting myofascial neck pain previously diagnosed by Dr. Trotter.  - Observed   Adequate curvature in back and shoulders.  - Evaluated range of motion in neck, noting pain and tightness in specific areas. rotation limited to 70° bilateral.  - Identified ulnar nerve involvement based on symptoms and response to stretching.  - Recommend muscle relaxers to address muscle tension and spasms.    CERVICAL NECK PAIN (CERVICALGIA):   Assessed the patient's neck pain, which has been ongoing for 7-8 months, with daily dull pain between 3-4 on a pain scale, and intense episodes reaching 7-8 with sharp pain radiating down the arm.   Noted the patient experiences tightness, spasms, and pain that shoots across the shoulder and sometimes causes ear drooping.   Performed physical exam, observing pain and tightness in specific areas of the neck during movement.   Diagnosed the condition as myofascial neck pain, confirming previous diagnosis by Dr. Trotter.   Ordered X-ray of the cervical spine to evaluate cervical spine spaces and for further assessment.   Prescribed cyclobenzaprine (muscle relaxer) for 1 month, to be taken at night as needed.   Recommend continuing physical therapy and chiropractic care.   Demonstrated and instructed on specific neck exercises, including chin tucks and range of  motion exercises.  Neck cars.  Ulnar stretching.   Explained the connection between shoulder pain and neck muscles.    LEFT SHOULDER PAIN:   Noted patient's report of left shoulder pain ongoing for 7-8 months, with daily dull pain and intense episodes during certain activities.   Examined the shoulder area, observing decent curvature and tightness.   Acknowledged the shoulder pain and its potential connection to neck issues and occupational factors.   Recommend continuing physical therapy and chiropractic care for shoulder pain management.   Demonstrated a stretching exercise for the shoulder.    CERVICAL RADICULOPATHY:   Noted patient's report of sharp pain radiating down the arm from the neck area, with tingling sensations.   Examined the neck and shoulder area, observing pain radiating down the left arm and ulnar nerve involvement.   Acknowledged the radicular symptoms and ordered an X-ray of the neck for further evaluation.   Prescribed cyclobenzaprine (muscle relaxer) to address symptoms.   Demonstrated and instructed on ulnar nerve stretch technique.   Advised continuing physical therapy and chiropractic care.    PARESTHESIA:   Noted patient's report of tingling sensations in the arm and shoulder area.   Recommend ulnar nerve stretches to address the tingling sensations.  Check x-ray to evaluate disc spaces and consider impingement.    DIZZINESS:   Noted patient's report of experiencing dizziness when turning the neck in certain ways.  Monitor discussed red flags with patient and follow-up parameters.    NAUSEA:   Noted patient's report of feeling nauseous, particularly in the shoulder area, associated with nerve pressure.   Acknowledged the nausea as potentially related to nerve irritation.  Monitor discussed red flags with patient and appropriate follow-up parameters.    HEADACHE:   Noted patient's report of experiencing mild headaches associated with stretching and neck movement.  Related to neck pain  continue therapy, muscle relaxers, heat and ice.  No neuro red flags.    FAMILY HISTORY OF MUSCULOSKELETAL CONDITIONS:   Not  ed patient's report of sister and father dealing with similar pain in the same area.    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   Noted patient's mention of a history of physical activities that may have contributed to his current condition, including baseball and boxing.    TREATMENT PLAN AND FOLLOW-UP:   Mr. Bower to continue physical therapy and chiropractic treatments.   Mr. Bower to practice chin tucks and neck range of motion exercises regularly.  Neck cars and ulnar stretching.   Started cyclobenzaprine 1-2 tablets at nighttime as needed for muscle relaxation; provided 1 month supply; take for a few nights initially, then use off and on as needed.   Scheduled follow up in 1 week to review progress and X-ray results.           I will review all results and address accordingly.   Follow up in about 1 week (around 3/25/2025).    ________________________________________________________________  ________________________________________________________________      Chief Complaint   Patient presents with    Shoulder Pain     For 8 months. Goes to PT and states that it helps but pain is still persistent. Left side. Says the pain shoots to his elbow/arm.     History of present illness  History of Present Illness    CHIEF COMPLAINT:  Mr. Bower presents today for shoulder pain.    MUSCULOSKELETAL PAIN:  He reports left shoulder and neck pain ongoing for 7-8 months. Pain intensity fluctuates between 3-4/10 daily, increasing to 7-8/10 with increased activity. Pain radiates down the left arm with associated tingling sensation. He experiences dizziness and nausea with neck movements, particularly when  rotation is limited. He reports occasional throbbing headaches.    CURRENT TREATMENTS:  He attends physical therapy twice weekly and receives monthly chiropractic care for neck pain. He previously received muscle relaxers from urgent care for severe pain episodes but is not currently using them.    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 He has a history of high-impact sports including baseball and boxing.    FAMILY HISTORY:  Father and sister have history of similar neck and shoulder pain.      ROS:  ENT: +ear pain  Gastrointestinal: +nausea  Musculoskeletal: +pain with movement, +shooting pain sensation, +muscle spasms, +muscle tension, +muscle weakness  Neurological: +headache, +dizziness, +tingling  Notes a radiculopathy involving the left upper arm posterior upper and sometimes more of an ulnar pattern down into the left-hand.  Describes a tingling numbness feeling.             History reviewed. No pertinent past medical history.    Past Surgical History:   Procedure Laterality Date    FRACTURE SURGERY      right hand       No family history on file.    Social History     Socioeconomic History    Marital status:    Tobacco Use    Smoking status: Never    Smokeless tobacco: Never   Substance and Sexual Activity    Alcohol use: Yes     Comment: occasionally    Drug use: No    Sexual activity: Yes     Partners: Female     Social Drivers of Health     Financial Resource Strain: Low Risk  (7/28/2024)    Overall Financial Resource Strain (CARDIA)     Difficulty of Paying Living Expenses: Not very hard   Food Insecurity: No Food Insecurity (7/28/2024)    Hunger Vital Sign     Worried About Running Out of Food in the Last Year: Never true     Ran Out of Food in the Last Year: Never true   Transportation Needs: No Transportation Needs  (3/29/2023)    PRAPARE - Transportation     Lack of Transportation (Medical): No     Lack of Transportation (Non-Medical): No   Physical Activity: Insufficiently Active (7/28/2024)    Exercise Vital Sign     Days of Exercise per Week: 2 days     Minutes of Exercise per Session: 20 min   Stress: Stress Concern Present (7/28/2024)    [employer] - Occupational Stress Questionnaire     Feeling of Stress : To some extent   Housing Stability: Unknown (3/29/2023)    Housing Stability Vital Sign     Unable to Pay for Housing in the Last Year: No     Unstable Housing in the Last Year: No       Current Medications[1]    Review of patient's allergies indicates:  No Known Allergies    Physical examination  Vitals Reviewed  /82 (BP Location: Right arm, Patient Position: Sitting)   Pulse 73   Temp 98.6 °F (37 °C) (Oral)   Wt 81.9 kg (180 lb 8.9 oz)   SpO2 99%   BMI 26.66 kg/m²  Body mass index is 26.66 kg/m².     BP Readings from Last 3 Encounters:   03/18/25 126/82   11/06/24 118/78   07/29/24 126/84       Wt Readings from Last 3 Encounters:   03/18/25 81.9 kg (180 lb 8.9 oz)   11/06/24 85.7 kg (189 lb)   07/29/24 82.1 kg (181 lb)     Physical Exam    HENT: Throat looks good.    Ears: Right ear looks good. No redness or swelling in right ear.       Spasms of the trapezius muscle and cervical paraspinal muscles.  Overall we increase muscle tone noted all muscular groups.  Decreased range of motion of the neck rotation approximately to 70° bilateral.  Pain with forward bending patient notes stretching and neck in paraspinal muscles.    This note was generated with the assistance of ambient listening technology. Verbal consent was obtained by the patient and accompanying visitor(s) for the recording of patient appointment to facilitate this note. I attest to having reviewed and edited the generated note for accuracy, though some syntax or spelling errors may persist. Please contact the  author of this note for any clarification.      Call or return to clinic prn if these symptoms worsen or fail to improve as anticipated.           [1]   Current Outpatient Medications   Medication Sig Dispense Refill    ergocalciferol (ERGOCALCIFEROL) 50,000 unit Cap Take 1 capsule (50,000 Units total) by mouth every 7 days. 4 capsule 11    cyclobenzaprine (FLEXERIL) 5 MG tablet Take 2 tablets (10 mg total) by mouth nightly as needed for Muscle spasms. 60 tablet 0     No current facility-administered medications for this visit.

## 2025-03-18 NOTE — PATIENT INSTRUCTIONS
Risk of somnolence discussed with patient. Do not drive or operate machinery while taking cyclobenzaprine muscle spasm medication. Do not engage in task that require mental alertness.

## 2025-03-24 ENCOUNTER — CLINICAL SUPPORT (OUTPATIENT)
Dept: REHABILITATION | Facility: HOSPITAL | Age: 40
End: 2025-03-24
Payer: MEDICAID

## 2025-03-24 DIAGNOSIS — R29.3 POOR POSTURE: Primary | ICD-10-CM

## 2025-03-24 DIAGNOSIS — M53.82 DECREASED RANGE OF MOTION OF INTERVERTEBRAL DISCS OF CERVICAL SPINE: ICD-10-CM

## 2025-03-24 PROCEDURE — 97110 THERAPEUTIC EXERCISES: CPT | Mod: PN

## 2025-03-24 NOTE — PROGRESS NOTES
Physical Therapy Visit    Patient Name: Bonifacio Bower  MRN: [id number]  YOB: 1985  Encounter Date: 3/24/2025    Therapy Diagnosis:   Encounter Diagnoses   Name Primary?    Poor posture Yes    Decreased range of motion of intervertebral discs of cervical spine      Physician: Jeffery Bruce MD    Physician Orders: Eval and Treat  Medical Diagnosis: Myofascial neck pain    Visit # / Visits Authorized:    Insurance Authorization Period: 2025 to 2025  Date of Evaluation: 2025  Plan of Care Certification: 2025 to 2025    PT/PTA: PT   Number of PTA visits since last PT visit:0  Time In: 1002   Time Out: 1102  Total Time: 60   Total Billable Time:   60    FOTO:  Intake Score: 67%  Survey Score 1: 63%  Survey Score 2:  %         Subjective   Continues to get L arm and L side neck pain. Reports having  this past week and seeked out other medical treatment for relief of symptoms. He saw chiropractor and NP who order imaging and follows up with him tomorrow abut the findings..  Pain reported as 5/10. L sided neck and L arm pain    Objective            Treatment:  Therapeutic Exercise  TE 1: UBE 3 min FWD and 3 min BWD  TE 2: repeated chin retractions prone green TB 2 x 10  TE 3: seated no money red TB 2 x 10  TE 4: prone middle trap leve 1 2 x 10  TE 5: SL open books 15 reps each side green TB  TE 6: Medx machine  Therapeutic Activity  TA 1: Baseline: pain shooting down the L arm with neck flexion, pain L SB and rotation  and extension  TA 2: repeated chin retraction extension 10 reps, decrease better extension L SB and L rotation, no effect L arm with flexion  TA 3: repeated chin retraction L SB 2 x 10 reps, no effect  TA 4: repeated chin retractions L rotation 10 reps, no effect on arm pain improvement in other directons  TA 5: seated thoracic extensions 15 reps, decrease better L arm pain  TA 6: ULTT performed, negative  TA 7: sustained neck extension x 2 min, decrease  better L arm jagjit    Time Entry(in minutes):  Therapeutic Exercise Time Entry: 60    Assessment & Plan   Assessment: Patient arrives with continuation of L sided neck and arm pain with minimal change. He continues to report pain mostly with neck flexion and driving. neck flexion with reproduction of L arm pain used as baseline. Repeated thoracic extensions lead to some relief. Sustained neck extension also lead to some relief in the L arm pain. Repeated movements lead to improvement in L scapular pain and improved ROM. Medx weight remained the same but ROM flexion increased to 90 today meeting ROM goal.  Evaluation/Treatment Tolerance: Patient tolerated treatment well    Patient will continue to benefit from skilled outpatient physical therapy to address the deficits listed in the problem list box on initial evaluation, provide pt/family education and to maximize pt's level of independence in the home and community environment.     Patient's spiritual, cultural, and educational needs considered and patient agreeable to plan of care and goals.           Plan: Continue POC as indicated. Progress as tolerated.    Goals:   Active       Long       - Pt will demonstrate independence with the HEP at discharge.  (Progressing)       Start:  02/21/25    Expected End:  05/02/25               Long Term Goal       - Pt will demonstratte increased cervical MedX ROM as measured by med ex by 6 degrees from initial test which results in functional ROM of neck for ease with ADLs and driving.  Appropriate and Ongoing  (Met)       Start:  02/21/25    Expected End:  05/02/25    Resolved:  03/24/25         - Pt will demonstrate increased MedX average isometric strength value  by 30% from initial test to improve ability to lift and carry, and sustain good posture while performing ADL's. Appropriate and Ongoing  (Progressing)       Start:  02/21/25    Expected End:  05/02/25            - Pt will demonstrate reduced pain and improved  functional outcomes as reported on the FOTO by reaching an intake score of >/= 71% functional ability in order to demonstrate subjective improvement in patient's condition. . Appropriate and Ongoing  (Progressing)       Start:  02/21/25    Expected End:  05/02/25            - Pt will demonstrate independence with reducing or controlling symptoms with ther ex, movement, or position independently, able to reduce pain 2-4 points on pain scale using strategies taught in therapy. Appropriate and Ongoing  (Progressing)       Start:  02/21/25    Expected End:  05/02/25               Short Term Goal       - Pt will demonstratte increased cervical MedX ROM as measured by med ex by 3 degrees from initial test which results in improved  ROM of neck for ease with ADLs and driving. Appropriate and Ongoing  (Met)       Start:  02/21/25    Expected End:  05/02/25    Resolved:  03/24/25         - Pt will demonstrate independence with reducing or controlling symptoms with ther ex, movement, or position independently, able to reduce pain 1-2 points on pain scale using strategies taught in therapy.  Appropriate and Ongoing  (Progressing)       Start:  02/21/25    Expected End:  05/02/25            - Pt will demonstrate increased MedX average isometric strength value by 15% with  when compared to the initial testing resulting in improved ability to perform bending, lifting, and carrying activities safely and confidently. Appropriate and Ongoing  (Progressing)       Start:  02/21/25    Expected End:  05/02/25                Sera Wyman, PT

## 2025-03-25 ENCOUNTER — OFFICE VISIT (OUTPATIENT)
Dept: FAMILY MEDICINE | Facility: CLINIC | Age: 40
End: 2025-03-25
Payer: MEDICAID

## 2025-03-25 VITALS
SYSTOLIC BLOOD PRESSURE: 124 MMHG | OXYGEN SATURATION: 99 % | BODY MASS INDEX: 26.84 KG/M2 | TEMPERATURE: 99 F | HEIGHT: 69 IN | HEART RATE: 75 BPM | DIASTOLIC BLOOD PRESSURE: 68 MMHG | WEIGHT: 181.19 LBS

## 2025-03-25 DIAGNOSIS — M54.2 MYOFASCIAL NECK PAIN: ICD-10-CM

## 2025-03-25 DIAGNOSIS — M53.82 DECREASED RANGE OF MOTION OF INTERVERTEBRAL DISCS OF CERVICAL SPINE: Primary | ICD-10-CM

## 2025-03-25 PROCEDURE — 3008F BODY MASS INDEX DOCD: CPT | Mod: CPTII,,, | Performed by: NURSE PRACTITIONER

## 2025-03-25 PROCEDURE — 3078F DIAST BP <80 MM HG: CPT | Mod: CPTII,,, | Performed by: NURSE PRACTITIONER

## 2025-03-25 PROCEDURE — 99999 PR PBB SHADOW E&M-EST. PATIENT-LVL III: CPT | Mod: PBBFAC,,, | Performed by: NURSE PRACTITIONER

## 2025-03-25 PROCEDURE — 99213 OFFICE O/P EST LOW 20 MIN: CPT | Mod: PBBFAC,PN | Performed by: NURSE PRACTITIONER

## 2025-03-25 PROCEDURE — 99999PBSHW PR PBB SHADOW TECHNICAL ONLY FILED TO HB: Mod: PBBFAC,,,

## 2025-03-25 PROCEDURE — 96372 THER/PROPH/DIAG INJ SC/IM: CPT | Mod: PBBFAC,PN

## 2025-03-25 PROCEDURE — 1159F MED LIST DOCD IN RCRD: CPT | Mod: CPTII,,, | Performed by: NURSE PRACTITIONER

## 2025-03-25 PROCEDURE — 99215 OFFICE O/P EST HI 40 MIN: CPT | Mod: S$PBB,,, | Performed by: NURSE PRACTITIONER

## 2025-03-25 PROCEDURE — 3074F SYST BP LT 130 MM HG: CPT | Mod: CPTII,,, | Performed by: NURSE PRACTITIONER

## 2025-03-25 RX ORDER — DEXAMETHASONE SODIUM PHOSPHATE 4 MG/ML
8 INJECTION, SOLUTION INTRA-ARTICULAR; INTRALESIONAL; INTRAMUSCULAR; INTRAVENOUS; SOFT TISSUE
Status: COMPLETED | OUTPATIENT
Start: 2025-03-25 | End: 2025-03-25

## 2025-03-25 RX ADMIN — DEXAMETHASONE SODIUM PHOSPHATE 8 MG: 4 INJECTION INTRA-ARTICULAR; INTRALESIONAL; INTRAMUSCULAR; INTRAVENOUS; SOFT TISSUE at 11:03

## 2025-03-25 NOTE — PROGRESS NOTES
This dictation has been generated using Modal Fluency Dictation some phonetic errors may occur. Please contact author for clarification if needed.     1. Decreased range of motion of intervertebral discs of cervical spine    2. Myofascial neck pain    Other orders  -     dexAMETHasone injection 8 mg       Assessment & Plan    IMPRESSION:  - Assessed neck pain and range of motion, noting improvement but ongoing issues with forward flexion and left rotation.  - Reviewed neck XRs, observing straightening of cervical spine potentially due to muscle spasm, but no significant impingement or concerning findings in visible vertebral spaces.  - Pain likely stemming from C8-T2 area, exacerbated by previous work as a  and current computer use for photography/video work.    CERVICALGIA (NECK PAIN):   Noted patient reports back and neck pain, with some improvement after physical therapy. Identified pain triggers when looking down, present in the back and left tricep area. Observed tightness in neck muscles, especially in the sternocleidomastoid area, sometimes requiring rest and heat therapy. Performed physical exam revealing neck spasms and reduced range of motion, particularly in forward flexion and lateral bending to the left. Reviewed x-ray showing straightening of the cervical spine, potentially due to positioning or reflecting spasm. Noted no impingement or significant abnormalities in the cervical region. Administered decadron injection to reduce inflammation in neck area. Planned decadron injection to reduce inflammation. Continue physical therapy. Mr. Bower to continue with current physical therapy regimen. Recommend implementing ice and heat therapy: Apply ice to neck area for 2-3 minutes, alternating with heat application. Advised use of alternating ice and heat therapy for pain relief and inflammation reduction. Advised use of alternating ice and heat therapy for muscle spasms. Educated the patient on the  effects of cold therapy, noting studies showing optimal benefit within 2-3 minutes of application. Discussed benefits of alternating ice and heat therapy for pain management and inflammation reduction. Can be done while sitting in a hot tub with a bag of ice. Recommend massage for pain relief. Mr. Bower can consider incorporating massage for pain relief. Noted patient takes Flexeril at night for muscle relaxation and sleep improvement.    FLATBACK SYNDROME:   Reviewed x-ray showing straightening of the cervical spine, potentially indicative of flatback syndrome.    MUSCLE SPASM:   Noted patient experiences muscle spasms in the neck area, particularly in the sternocleidomastoid muscle. Performed physical exam revealing neck spasms. Noted patient takes Flexeril at night for muscle relaxation.    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 No particular injury and no MVA reported. Assessed neck and back pain likely due to occupational exposure to vibration from driving a dump truck. Discussed patient's consideration of alternative career options to avoid further exposure to vibration. Documented patient's history of working as a full-time , driving a dump truck. 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 Informed about potential sedation risks associated with medical marijuana and CBD products, especially in relation to driving.    PERSONAL HISTORY:   Noted patient reports back pain issues.           Decreased range of cervical spine and myofascial neck pain.  Better today than yesterday.  Still with daily symptoms patient request injection.  Side effect risk benefits discussed.  Patient elected to move forward with injection.  In excessive of 40 minutes total time spent for  evaluation and management services. Time included elements of the following: time spent preparing to see patient, obtaining and reviewing separately obtained history, exam, evaluation, counseling and education of patient/family member or care giver, documenting in the HMR, independently interpreting results and communication of results, coordination of care ordering medications, tests, or procedures, referral and communication to other health care professionals.    I will review all results and address accordingly.   No follow-ups on file.    ________________________________________________________________  ________________________________________________________________      Chief Complaint   Patient presents with    Follow-up     History of present illness  History of Present Illness    CHIEF COMPLAINT:  Mr. Bower presents today for follow up of back and neck pain.    MUSCULOSKELETAL:  He reports neck pain with forward flexion and increased tightness when turning left. He experiences significant muscle tightness and spasms extending to behind the ears, which can be severe enough to require lying down in hot water and temporarily interrupt work activities. He notes improvement with physical therapy and stretching exercises. He takes Flexeril nightly for symptom management. His symptoms may be related to his previous work as a [email or web address]  which involved frequent forward leaning, bouncing, and jarring movements while wearing a seatbelt. His current work as a  and  requires prolonged periods of sitting at a computer. Improved symptoms today vs prior. Requested steroid shot.      ROS:  Musculoskeletal: +neck pain, +back pain, +pain with movement, +limited movement, +neck stiffness, +neck tension, +muscle tension, +muscle spasms. No neuropathy.         History reviewed. No pertinent past medical history.    Past Surgical History:   Procedure Laterality Date    FRACTURE SURGERY       "right hand       No family history on file.    Social History     Socioeconomic History    Marital status:    Tobacco Use    Smoking status: Never    Smokeless tobacco: Never   Substance and Sexual Activity    Alcohol use: Yes     Comment: occasionally    Drug use: No    Sexual activity: Yes     Partners: Female     Social Drivers of Health     Financial Resource Strain: Low Risk  (7/28/2024)    Overall Financial Resource Strain (CARDIA)     Difficulty of Paying Living Expenses: Not very hard   Food Insecurity: No Food Insecurity (7/28/2024)    Hunger Vital Sign     Worried About Running Out of Food in the Last Year: Never true     Ran Out of Food in the Last Year: Never true   Transportation Needs: No Transportation Needs (3/29/2023)    PRAPARE - Transportation     Lack of Transportation (Medical): No     Lack of Transportation (Non-Medical): No   Physical Activity: Insufficiently Active (7/28/2024)    Exercise Vital Sign     Days of Exercise per Week: 2 days     Minutes of Exercise per Session: 20 min   Stress: Stress Concern Present (7/28/2024)    [employer] - Occupational Stress Questionnaire     Feeling of Stress : To some extent   Housing Stability: Unknown (3/29/2023)    Housing Stability Vital Sign     Unable to Pay for Housing in the Last Year: No     Unstable Housing in the Last Year: No       Current Medications[1]    Review of patient's allergies indicates:  No Known Allergies    Physical examination  Vitals Reviewed  /68   Pulse 75   Temp 98.8 °F (37.1 °C) (Oral)   Ht 5' 9" (1.753 m)   Wt 82.2 kg (181 lb 3.5 oz)   SpO2 99%   BMI 26.76 kg/m²  Body mass index is 26.76 kg/m².     BP Readings from Last 3 Encounters:   03/25/25 124/68   03/18/25 126/82   11/06/24 118/78       Wt Readings from Last 3 Encounters:   03/25/25 82.2 kg (181 lb 3.5 oz)   03/18/25 81.9 kg (180 lb 8.9 oz)   11/06/24 85.7 kg (189 lb)       Physical Exam    Neck: Neck spasms. " Straightening of the neck.       Reviewed x-ray images with patient and symptoms consistent with current neck spasms.  Spasm noted of the cervical neck muscle and sternocleidomastoid.  Loss of cervical lordosis mild straightening of neck.   strength equal bilateral.    This note was generated with the assistance of ambient listening technology. Verbal consent was obtained by the patient and accompanying visitor(s) for the recording of patient appointment to facilitate this note. I attest to having reviewed and edited the generated note for accuracy, though some syntax or spelling errors may persist. Please contact the author of this note for any clarification.      Call or return to clinic prn if these symptoms worsen or fail to improve as anticipated.           [1]   Current Outpatient Medications   Medication Sig Dispense Refill    cyclobenzaprine (FLEXERIL) 5 MG tablet Take 2 tablets (10 mg total) by mouth nightly as needed for Muscle spasms. 60 tablet 0    ergocalciferol (ERGOCALCIFEROL) 50,000 unit Cap Take 1 capsule (50,000 Units total) by mouth every 7 days. 4 capsule 11     No current facility-administered medications for this visit.

## 2025-03-27 ENCOUNTER — CLINICAL SUPPORT (OUTPATIENT)
Dept: REHABILITATION | Facility: HOSPITAL | Age: 40
End: 2025-03-27
Payer: MEDICAID

## 2025-03-27 DIAGNOSIS — M53.82 DECREASED RANGE OF MOTION OF INTERVERTEBRAL DISCS OF CERVICAL SPINE: ICD-10-CM

## 2025-03-27 DIAGNOSIS — R29.3 POOR POSTURE: Primary | ICD-10-CM

## 2025-03-27 PROCEDURE — 97110 THERAPEUTIC EXERCISES: CPT | Mod: PN

## 2025-03-27 NOTE — PROGRESS NOTES
Physical Therapy Visit    Patient Name: Bonifacio Bower  MRN: [id number]  YOB: 1985  Encounter Date: 3/27/2025    Therapy Diagnosis:   Encounter Diagnoses   Name Primary?    Poor posture Yes    Decreased range of motion of intervertebral discs of cervical spine      Physician: Jeffery Bruce MD    Physician Orders: Eval and Treat  Medical Diagnosis: Myofascial neck pain    Visit # / Visits Authorized:  6 / 20  Insurance Authorization Period: 2/21/2025 to 12/31/2025  Date of Evaluation: 2/21/2025  Plan of Care Certification: 2/21/2025 to 5/2/2025     PT/PTA: PT   Number of PTA visits since last PT visit:0  Time In: 0958   Time Out: 1059  Total Time: 61   Total Billable Time: 61    FOTO:  Intake Score: 67%  Survey Score 1: 63%  Survey Score 2:  %         Subjective   Reports continuation of L upper scapular and lower L sided neck pain. He recieved a cortisone injection Tuesday with about 18 hours of relief. Reports he hasnt noticed the L arm pain since the shot. Reports he felt good after his last tx session..  Pain reported as 3/10. L sided neck and L arm pain    Objective            Treatment:  Therapeutic Exercise  TE 1: UBE 3 min FWD and 3 min BWD  TE 2: repeated chin retractions prone green TB 2 x 10  TE 3: seated no money red TB 2 x 10  TE 4: prone middle trap T BUE 2 x 10  TE 5: SL open books 15 reps each side green TB  TE 6: Medx machine  TE 7: PNF D2 Flexion red TB 10 reps each UE  TE 8: prone Y  2 x 10 reps each UE  TE 9: all peripheral machines  TE 10: prone shoulder extensions 2lb DB 2 x 10  Manual Therapy  MT 1: PA mobs thoracic spine grade 3 x 5 min  Balance/Neuromuscular Re-Education  NMR 1: serratus wall slides 2 x 10 red TB  NMR 2: scapular clocks red TB 10 reps each UE  Therapeutic Activity  TA 1: sustained neck extension in prone x 2 min  TA 2: seated thoracic extensions 20 reps  TA 3: repeated chin retraction extension 20 reps        3/27/2025    11:21 AM   [facility]    Visit Number 7   VAS Pain Rating 3   Cervical Weight 270 lbs   Repetitions 19   Rating of Perceived Exertion 4     Time Entry(in minutes):  Therapeutic Exercise Time Entry: 61    Assessment & Plan   Assessment: Arrives today with minimal pain but continuation of slight aggravation near superior scapular angle of upper thoracic spinal region. He continues to respond well to repeated extensions and sustained neck extension. He tolerated tx session well without complaints.  Evaluation/Treatment Tolerance: Patient tolerated treatment well    Patient will continue to benefit from skilled outpatient physical therapy to address the deficits listed in the problem list box on initial evaluation, provide pt/family education and to maximize pt's level of independence in the home and community environment.     Patient's spiritual, cultural, and educational needs considered and patient agreeable to plan of care and goals.           Plan: Continue POC as indicated. Progress as tolerated.    Goals:   Active       Long       - Pt will demonstrate independence with the HEP at discharge.  (Progressing)       Start:  02/21/25    Expected End:  05/02/25               Long Term Goal       - Pt will demonstratte increased cervical MedX ROM as measured by med ex by 6 degrees from initial test which results in functional ROM of neck for ease with ADLs and driving.  Appropriate and Ongoing  (Met)       Start:  02/21/25    Expected End:  05/02/25    Resolved:  03/24/25         - Pt will demonstrate increased MedX average isometric strength value  by 30% from initial test to improve ability to lift and carry, and sustain good posture while performing ADL's. Appropriate and Ongoing  (Progressing)       Start:  02/21/25    Expected End:  05/02/25            - Pt will demonstrate reduced pain and improved functional outcomes as reported on the FOTO by reaching an intake score of >/= 71% functional ability in order to demonstrate subjective  improvement in patient's condition. . Appropriate and Ongoing  (Progressing)       Start:  02/21/25    Expected End:  05/02/25            - Pt will demonstrate independence with reducing or controlling symptoms with ther ex, movement, or position independently, able to reduce pain 2-4 points on pain scale using strategies taught in therapy. Appropriate and Ongoing  (Progressing)       Start:  02/21/25    Expected End:  05/02/25               Short Term Goal       - Pt will demonstratte increased cervical MedX ROM as measured by med ex by 3 degrees from initial test which results in improved  ROM of neck for ease with ADLs and driving. Appropriate and Ongoing  (Met)       Start:  02/21/25    Expected End:  05/02/25    Resolved:  03/24/25         - Pt will demonstrate independence with reducing or controlling symptoms with ther ex, movement, or position independently, able to reduce pain 1-2 points on pain scale using strategies taught in therapy.  Appropriate and Ongoing  (Progressing)       Start:  02/21/25    Expected End:  05/02/25            - Pt will demonstrate increased MedX average isometric strength value by 15% with  when compared to the initial testing resulting in improved ability to perform bending, lifting, and carrying activities safely and confidently. Appropriate and Ongoing  (Progressing)       Start:  02/21/25    Expected End:  05/02/25                Sera Wyman PT

## 2025-04-08 ENCOUNTER — CLINICAL SUPPORT (OUTPATIENT)
Dept: REHABILITATION | Facility: HOSPITAL | Age: 40
End: 2025-04-08
Payer: MEDICAID

## 2025-04-08 DIAGNOSIS — M53.82 DECREASED RANGE OF MOTION OF INTERVERTEBRAL DISCS OF CERVICAL SPINE: ICD-10-CM

## 2025-04-08 DIAGNOSIS — R29.3 POOR POSTURE: Primary | ICD-10-CM

## 2025-04-08 PROCEDURE — 97110 THERAPEUTIC EXERCISES: CPT | Mod: PN

## 2025-04-08 NOTE — PROGRESS NOTES
Physical Therapy Visit    Patient Name: Bonifacio Bower  MRN: [id number]  YOB: 1985  Encounter Date: 4/8/2025    Therapy Diagnosis:   Encounter Diagnoses   Name Primary?    Poor posture Yes    Decreased range of motion of intervertebral discs of cervical spine      Physician: Jeffery Bruce MD    Physician Orders: Eval and Treat  Medical Diagnosis: Myofascial neck pain    Visit # / Visits Authorized:  7 / 20  Insurance Authorization Period: 2/21/2025 to 12/31/2025    Date of Evaluation: 2/21/2025  Plan of Care Certification: 2/21/2025 to 5/2/2025     PT/PTA: PT   Number of PTA visits since last PT visit:0  Time In: 1005   Time Out: 1100  Total Time: 55   Total Billable Time: 55    FOTO:  Intake Score:  %  Survey Score 1:  %  Survey Score 2:  %         Subjective   Reports more midline lower neck and upper thoracic pain. He continues to get intermittent LUE pain mostly with driving but he reports it is better than when he started therapy. Reports some soreness not necessarily pain..  Pain reported as 2/10. L sided neck and L arm pain    Objective            Treatment:  Therapeutic Exercise  TE 1: UBE 3 min FWD and 3 min BWD L5  TE 2: prone scapular retractions 2 x 10  TE 3: seated no money green TB 2 x 10  TE 4: prone middle trap T BUE 2 x 10  TE 5: L levator scap stretch 3 x 30 sec  TE 6: Medx machine  TE 7: PNF D2 Flexion green TB 10 reps each UE  TE 8: prone Y  15 reps each LUE, had some discomfort in the mid thoracic region with LUE but this improved following repeated cervical movements, R not performed due to R shoulder pain  TE 9: all peripheral machines  Manual Therapy  MT 1: PA mobs thoracic spine grade 3 x 5 min  Therapeutic Activity  TA 2: seated thoracic extensions 20 reps  TA 3: repeated chin retraction extension 10 reps, improvement in thoracic discomfort with prone LUE Y        4/8/2025    11:38 AM   [facility]   Visit Number 8   VAS Pain Rating 2   Cervical Flexion 96    Cervical Extension 18   Cervical Weight 270 lbs   Repetitions 17   Rating of Perceived Exertion 5       Time Entry(in minutes):  Therapeutic Exercise Time Entry: 55    Assessment & Plan   Assessment: Continues to experience midline to L sided neck and scapular pain .Today he arrives with mostly midline neck and upper thoracic soreness. He was able to complete most exercises without complaints. He did have some discomfort with performing prone Y on L side but this improved following repeated retraction extension. He was unable to do R side Y exercise due to R shoulder pain. He tolerated tx session fairly well. He was able to progress on medx machine to 96 deg flexion.  Evaluation/Treatment Tolerance: Patient tolerated treatment well    Patient will continue to benefit from skilled outpatient physical therapy to address the deficits listed in the problem list box on initial evaluation, provide pt/family education and to maximize pt's level of independence in the home and community environment.     Patient's spiritual, cultural, and educational needs considered and patient agreeable to plan of care and goals.           Plan: Continue POC as indicated. Progress as tolerated.    Goals:   Active       Long       - Pt will demonstrate independence with the HEP at discharge.  (Progressing)       Start:  02/21/25    Expected End:  05/02/25               Long Term Goal       - Pt will demonstratte increased cervical MedX ROM as measured by med ex by 6 degrees from initial test which results in functional ROM of neck for ease with ADLs and driving.  Appropriate and Ongoing  (Met)       Start:  02/21/25    Expected End:  05/02/25    Resolved:  03/24/25         - Pt will demonstrate increased MedX average isometric strength value  by 30% from initial test to improve ability to lift and carry, and sustain good posture while performing ADL's. Appropriate and Ongoing  (Progressing)       Start:  02/21/25    Expected End:   05/02/25            - Pt will demonstrate reduced pain and improved functional outcomes as reported on the FOTO by reaching an intake score of >/= 71% functional ability in order to demonstrate subjective improvement in patient's condition. . Appropriate and Ongoing  (Progressing)       Start:  02/21/25    Expected End:  05/02/25            - Pt will demonstrate independence with reducing or controlling symptoms with ther ex, movement, or position independently, able to reduce pain 2-4 points on pain scale using strategies taught in therapy. Appropriate and Ongoing  (Progressing)       Start:  02/21/25    Expected End:  05/02/25               Short Term Goal       - Pt will demonstratte increased cervical MedX ROM as measured by med ex by 3 degrees from initial test which results in improved  ROM of neck for ease with ADLs and driving. Appropriate and Ongoing  (Met)       Start:  02/21/25    Expected End:  05/02/25    Resolved:  03/24/25         - Pt will demonstrate independence with reducing or controlling symptoms with ther ex, movement, or position independently, able to reduce pain 1-2 points on pain scale using strategies taught in therapy.  Appropriate and Ongoing  (Progressing)       Start:  02/21/25    Expected End:  05/02/25            - Pt will demonstrate increased MedX average isometric strength value by 15% with  when compared to the initial testing resulting in improved ability to perform bending, lifting, and carrying activities safely and confidently. Appropriate and Ongoing  (Progressing)       Start:  02/21/25    Expected End:  05/02/25                Sera Wyman, PT

## 2025-04-21 ENCOUNTER — CLINICAL SUPPORT (OUTPATIENT)
Dept: REHABILITATION | Facility: HOSPITAL | Age: 40
End: 2025-04-21
Payer: MEDICAID

## 2025-04-21 DIAGNOSIS — M53.82 DECREASED RANGE OF MOTION OF INTERVERTEBRAL DISCS OF CERVICAL SPINE: ICD-10-CM

## 2025-04-21 DIAGNOSIS — R29.3 POOR POSTURE: Primary | ICD-10-CM

## 2025-04-21 PROCEDURE — 97110 THERAPEUTIC EXERCISES: CPT | Mod: PN

## 2025-04-21 NOTE — PROGRESS NOTES
Physical Therapy Visit    Patient Name: Bonifacio Bower  MRN: [id number]  YOB: 1985  Encounter Date: 4/21/2025    Therapy Diagnosis:   Encounter Diagnoses   Name Primary?    Poor posture Yes    Decreased range of motion of intervertebral discs of cervical spine      Physician: Jeffery Bruce MD    Physician Orders: Eval and Treat  Medical Diagnosis: Myofascial neck pain    Visit # / Visits Authorized:  8 / 20  Insurance Authorization Period: 2/21/2025 to 12/31/2025    Date of Evaluation: 2/21/2025  Plan of Care Certification: 2/21/2025 to 5/2/2025     PT/PTA: PT   Number of PTA visits since last PT visit:0  Time In: 1405   Time Out: 1500  Total Time: 55   Total Billable Time: 55    FOTO:  Intake Score: 67%  Survey Score 1: 63%  Survey Score 2: 72%         Subjective   Reports the L side of CT junction continues to bother him. He was noticing it with driving especially the other day; the pain was running into the L arm causingh him to have to pull over. The pain did improve with repeated extensions..  Pain reported as 2/10. L sided neck and L arm pain    Objective                4/21/2025     2:28 PM   [facility]   Visit Number 9   VAS Pain Rating 2   Cervical Flexion 102   Cervical Extension 18   Cervical Peak Torque 403 ft. lbs.     Cervical flexion and L SB L sided neck pain produced    Treatment:  Therapeutic Exercise  TE 1: UBE 3 min FWD and 3 min BWD L5  TE 2: Prone Y and T 2 x 10  TE 3: seated no money green TB 2 x 10  TE 4: serratus wall slides green TB 2 x 10  TE 6: Medx machine testing  TE 9: first three machines  Manual Therapy  MT 1: PA mobs thoracic spine grade 3 x 5 min  Therapeutic Activity  TA 1: Baselines: neck flexion and L SB reproduced pain  TA 2: repeated chin retraction extension 10 reps, decrease better L SB, improved ROM flexion  TA 3: repeated chin retraction L SB 10 reps, decrease better L SB, no effect flexion  TA 4: repeated chin retraction L rotation 10 reps, no  effect flexion  TA 5: sustained neck extension x 1.5 min, decrease better  TA 6: repeated thoracic extension/ kitchen stretch  TA 7: repeated thoracic L rotation 10 reps, alittle better        4/21/2025     2:28 PM   [facility]   Visit Number 9   VAS Pain Rating 2   Cervical Flexion 102   Cervical Extension 18   Cervical Peak Torque 403 ft. lbs.     Time Entry(in minutes):  Therapeutic Exercise Time Entry: 55    Assessment & Plan   Assessment: Patient arrives with minimal. Pain is reproduced with neck flexion and L SB. L SB baseline improved with repeated chin retraction extension with improvement in neck flexion ROM but minimal change in pain. He also reported slight improvement in neck flexion following repeated thoracic rotations. He demonstrates improvement in ROM on medx machine but he demonstrates minimal change in strength on medx machine. Overall he is progressing fairly well demonstrating improvement in FOTO score as well. He is compliant and independent with repeated movements.  Evaluation/Treatment Tolerance: Patient tolerated treatment well    Patient will continue to benefit from skilled outpatient physical therapy to address the deficits listed in the problem list box on initial evaluation, provide pt/family education and to maximize pt's level of independence in the home and community environment.     Patient's spiritual, cultural, and educational needs considered and patient agreeable to plan of care and goals.           Plan: Continue POC as indicated. Progress as tolerated.    Goals:   Active       Long       - Pt will demonstrate independence with the HEP at discharge.  (Progressing)       Start:  02/21/25    Expected End:  05/02/25               Long Term Goal       - Pt will demonstratte increased cervical MedX ROM as measured by med ex by 6 degrees from initial test which results in functional ROM of neck for ease with ADLs and driving.  Appropriate and Ongoing  (Met)       Start:   02/21/25    Expected End:  05/02/25    Resolved:  03/24/25         - Pt will demonstrate increased MedX average isometric strength value  by 30% from initial test to improve ability to lift and carry, and sustain good posture while performing ADL's. Appropriate and Ongoing  (Progressing)       Start:  02/21/25    Expected End:  05/02/25            - Pt will demonstrate reduced pain and improved functional outcomes as reported on the FOTO by reaching an intake score of >/= 71% functional ability in order to demonstrate subjective improvement in patient's condition. . Appropriate and Ongoing  (Met)       Start:  02/21/25    Expected End:  05/02/25    Resolved:  04/21/25         - Pt will demonstrate independence with reducing or controlling symptoms with ther ex, movement, or position independently, able to reduce pain 2-4 points on pain scale using strategies taught in therapy. Appropriate and Ongoing  (Met)       Start:  02/21/25    Expected End:  05/02/25    Resolved:  04/21/25            Short Term Goal       - Pt will demonstratte increased cervical MedX ROM as measured by med ex by 3 degrees from initial test which results in improved  ROM of neck for ease with ADLs and driving. Appropriate and Ongoing  (Met)       Start:  02/21/25    Expected End:  05/02/25    Resolved:  03/24/25         - Pt will demonstrate independence with reducing or controlling symptoms with ther ex, movement, or position independently, able to reduce pain 1-2 points on pain scale using strategies taught in therapy.  Appropriate and Ongoing  (Met)       Start:  02/21/25    Expected End:  05/02/25    Resolved:  04/21/25         - Pt will demonstrate increased MedX average isometric strength value by 15% with  when compared to the initial testing resulting in improved ability to perform bending, lifting, and carrying activities safely and confidently. Appropriate and Ongoing  (Progressing)       Start:  02/21/25    Expected End:  05/02/25                 eSra Wyman, PT

## 2025-04-24 ENCOUNTER — CLINICAL SUPPORT (OUTPATIENT)
Dept: REHABILITATION | Facility: HOSPITAL | Age: 40
End: 2025-04-24
Payer: MEDICAID

## 2025-04-24 DIAGNOSIS — M53.82 DECREASED RANGE OF MOTION OF INTERVERTEBRAL DISCS OF CERVICAL SPINE: ICD-10-CM

## 2025-04-24 DIAGNOSIS — R29.3 POOR POSTURE: Primary | ICD-10-CM

## 2025-04-24 PROCEDURE — 97110 THERAPEUTIC EXERCISES: CPT | Mod: PN

## 2025-04-24 NOTE — PROGRESS NOTES
Physical Therapy Visit    Patient Name: Bonifacio Bower  MRN: [id number]  YOB: 1985  Encounter Date: 4/24/2025    Therapy Diagnosis:   Encounter Diagnoses   Name Primary?    Poor posture Yes    Decreased range of motion of intervertebral discs of cervical spine      Physician: Jeffery Bruce MD    Physician Orders: Eval and Treat  Medical Diagnosis: Myofascial neck pain    Visit # / Visits Authorized:  9 / 20  Insurance Authorization Period: 2/21/2025 to 12/31/2025      Date of Evaluation: 2/21/2025  Plan of Care Certification: 2/21/2025 to 5/2/2025                   PT/PTA: PT   Number of PTA visits since last PT visit:0  Time In: 1020   Time Out: 1113  Total Time: 53   Total Billable Time: 53    FOTO:  Intake Score: 67%  Survey Score 1: 63%  Survey Score 2: 72%         Subjective   Reports the L side of his neck and upper thoracic continues to bother him. He feels a shot down the back of his arm when he reaches for that spot into the L arm..  Pain reported as 7/10. L sided neck and L arm pain    Objective            Treatment:  Therapeutic Exercise  TE 1: UBE 2 min FWD and 2 min BWD L5  TE 6: Medx machine  Manual Therapy  MT 1: PA mobs thoracic spine grade 3 x 5 min  Balance/Neuromuscular Re-Education  NMR 3: prone middle trap 2 x 10  Therapeutic Activity  TA 1: baselines:  neck flexion and L side bend, intense pain with neck flexion  TA 2: Repeated chin retraction extension 10 reps, no effect  TA 3: Repeated chin retraction L SB 10 reps, no effect  TA 4: repeated chin retraction L rotation 10 reps, no effect flexion  TA 5: sustained neck extension x 1.5 min, decrease better  TA 6: repeated thoracic extension/ kitchen stretch 10 reps  TA 7: sustained neck extension prone on elbows x 40 sec, a little better  TA 8: educated on neck extension on wall for at home        4/24/2025    11:30 AM   [facility]   Visit Number 10   VAS Pain Rating 7   Cervical Weight 270 lbs   Repetitions 11   Rating  of Perceived Exertion 7       Time Entry(in minutes):  Therapeutic Exercise Time Entry: 53    Assessment & Plan   Assessment: Patient arrives with increased pain today ay 7/10 pain. Minimal relief reported with repeated movements. He continues to experience alot of pain with looking down. He did report doing some work yesterday on his computer in his bed which might have contributed to increased paim today. He also continues to have trouble sleeping only sleeping 2-3 hours at a time. Medx weight remained the same but increase in ROM added today from increase in ROM on testing last tx session. He had to stop at rep 11 due to not feeling well. He sat in the chair for a few minutes prior to leaving with slight improvement.  Evaluation/Treatment Tolerance: Patient tolerated treatment well    Patient will continue to benefit from skilled outpatient physical therapy to address the deficits listed in the problem list box on initial evaluation, provide pt/family education and to maximize pt's level of independence in the home and community environment.     Patient's spiritual, cultural, and educational needs considered and patient agreeable to plan of care and goals.           Plan: Continue POC as indicated. Progress as tolerated.    Goals:   Active       Long       - Pt will demonstrate independence with the HEP at discharge.  (Progressing)       Start:  02/21/25    Expected End:  05/02/25               Long Term Goal       - Pt will demonstratte increased cervical MedX ROM as measured by med ex by 6 degrees from initial test which results in functional ROM of neck for ease with ADLs and driving.  Appropriate and Ongoing  (Met)       Start:  02/21/25    Expected End:  05/02/25    Resolved:  03/24/25         - Pt will demonstrate increased MedX average isometric strength value  by 30% from initial test to improve ability to lift and carry, and sustain good posture while performing ADL's. Appropriate and Ongoing   (Progressing)       Start:  02/21/25    Expected End:  05/02/25            - Pt will demonstrate reduced pain and improved functional outcomes as reported on the FOTO by reaching an intake score of >/= 71% functional ability in order to demonstrate subjective improvement in patient's condition. . Appropriate and Ongoing  (Met)       Start:  02/21/25    Expected End:  05/02/25    Resolved:  04/21/25         - Pt will demonstrate independence with reducing or controlling symptoms with ther ex, movement, or position independently, able to reduce pain 2-4 points on pain scale using strategies taught in therapy. Appropriate and Ongoing  (Met)       Start:  02/21/25    Expected End:  05/02/25    Resolved:  04/21/25            Short Term Goal       - Pt will demonstratte increased cervical MedX ROM as measured by med ex by 3 degrees from initial test which results in improved  ROM of neck for ease with ADLs and driving. Appropriate and Ongoing  (Met)       Start:  02/21/25    Expected End:  05/02/25    Resolved:  03/24/25         - Pt will demonstrate independence with reducing or controlling symptoms with ther ex, movement, or position independently, able to reduce pain 1-2 points on pain scale using strategies taught in therapy.  Appropriate and Ongoing  (Met)       Start:  02/21/25    Expected End:  05/02/25    Resolved:  04/21/25         - Pt will demonstrate increased MedX average isometric strength value by 15% with  when compared to the initial testing resulting in improved ability to perform bending, lifting, and carrying activities safely and confidently. Appropriate and Ongoing  (Progressing)       Start:  02/21/25    Expected End:  05/02/25                Sera Wyman, PT

## 2025-05-06 ENCOUNTER — TELEPHONE (OUTPATIENT)
Dept: FAMILY MEDICINE | Facility: CLINIC | Age: 40
End: 2025-05-06
Payer: MEDICAID

## 2025-05-09 ENCOUNTER — OFFICE VISIT (OUTPATIENT)
Dept: FAMILY MEDICINE | Facility: CLINIC | Age: 40
End: 2025-05-09
Payer: MEDICAID

## 2025-05-09 VITALS
SYSTOLIC BLOOD PRESSURE: 122 MMHG | BODY MASS INDEX: 27.56 KG/M2 | WEIGHT: 186.06 LBS | RESPIRATION RATE: 18 BRPM | HEART RATE: 74 BPM | HEIGHT: 69 IN | DIASTOLIC BLOOD PRESSURE: 82 MMHG | OXYGEN SATURATION: 99 % | TEMPERATURE: 98 F

## 2025-05-09 DIAGNOSIS — M54.12 LEFT CERVICAL RADICULOPATHY: Primary | ICD-10-CM

## 2025-05-09 PROCEDURE — 99213 OFFICE O/P EST LOW 20 MIN: CPT | Mod: PBBFAC,PN | Performed by: FAMILY MEDICINE

## 2025-05-09 PROCEDURE — 99999 PR PBB SHADOW E&M-EST. PATIENT-LVL III: CPT | Mod: PBBFAC,,, | Performed by: FAMILY MEDICINE

## 2025-05-09 RX ORDER — LIDOCAINE 50 MG/G
1 PATCH TOPICAL DAILY
Qty: 90 PATCH | Refills: 1 | Status: SHIPPED | OUTPATIENT
Start: 2025-05-09 | End: 2025-11-05

## 2025-05-09 NOTE — PROGRESS NOTES
Subjective:       Patient ID: Bonifacio Bower is a 40 y.o. male.    Chief Complaint: Follow-up (Shoulder pain)    Lab Results   Component Value Date    WBC 9.20 05/04/2024    HGB 14.6 05/04/2024    HCT 41.2 05/04/2024     05/04/2024    CHOL 184 10/30/2024    TRIG 68 10/30/2024    HDL 72 10/30/2024    ALT 17 10/30/2024    AST 21 10/30/2024     10/30/2024    K 4.4 10/30/2024     10/30/2024    CREATININE 1.0 10/30/2024    BUN 12 10/30/2024    CO2 26 10/30/2024    HGBA1C 4.7 10/30/2024         History of Present Illness    CHIEF COMPLAINT:  Mr. Bower presents with left shoulder pain and associated arm symptoms that have been ongoing for 8-10 months.    HPI:  Mr. Bower reports left shoulder pain present intermittently for approximately 8-10 months. The issue initially began on the right side with rotator cuff pain, but over time, the left side started to overcompensate, leading to the current left shoulder problem. He describes cramping in the left arm, weakness in  strength, and tingling sensations at times. The pain radiates from the shoulder area down the arm to the ulnar nerve region. He notes tightness in the shoulder area, particularly in the scapula muscle region.    His symptoms are exacerbated by certain movements, such as abducting the elbow or extending the arm with the thumb down. He mentions feeling strain in the wrist during these movements. He has been seeing a physical therapist for back issues and has two more appointments scheduled. He is also receiving myofascial release treatment from a practitioner named Krystal.    His previous occupation as a , operating large dump trucks, involved repetitive motions such as hauling, turning, leaning, and climbing in and out of the cab. 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 He has not been working for the past 6 months  due to his condition.    He tried lidocaine patches prescribed at an urgent care visit, which provided some relief. He has also used a massage gun at home to alleviate symptoms, finding that a stronger model with different attachments was more effective in addressing the tight, cramping sensations he describes as persistent muscle spasms.    He denies any on-the-job injury as the direct cause of his current symptoms.    MEDICATIONS:  Mr. Bower is on Lidocaine patches for shoulder pain relief. These patches are applied for 12 hours and then removed for 12 hours.    MEDICAL HISTORY:  Mr. Bower has a history of rotator cuff injury that began 8-10 months ago. The injury initially started on the right side and then shifted to the left side.    SOCIAL HISTORY:  Marijuana: Used recreationally in the past, currently uses for pain relief Occupation: , [employer] big trucks, driving [employer] trucks (currently not working for 6 months)    Marital status:       ROS:  Musculoskeletal: +limb pain, +muscle weakness, +muscle cramps, +pain with movement, +limited movement, +shooting pain sensation  Neurological: +tingling          Allergies and Medications:   Review of patient's allergies indicates:  No Known Allergies  Current Medications[1]    Family History:   No family history on file.    Social History:   Social History[2]        Objective:     Vitals:    05/09/25 1429   BP: 122/82   Pulse: 74   Resp: 18   Temp: 98.2 °F (36.8 °C)        Physical Exam    General: No acute distress. Well-developed. Well-nourished.  Eyes: EOMI. Sclerae anicteric.  HENT: Normocephalic. Atraumatic. Nares patent. Moist oral mucosa.  Cardiovascular: Regular rate. Regular rhythm. No murmurs. No rubs. No gallops. Normal S1, S2.  Respiratory: Normal respiratory effort. Clear to auscultation bilaterally. No rales. No rhonchi. No wheezing.  Musculoskeletal: No  obvious deformity.  Extremities: No lower extremity edema.  Neurological: Alert &  oriented x3. No slurred speech. Normal gait. No sensory or distal motor deficits.  Psychiatric: Normal mood. Normal affect. Good insight. Good judgment.  Skin: Warm. Dry. No rash.  MSK: Shoulder - Left: Crepitance with decreased range of motion to internal rotation. Positive Neer test with crepitus. Negative Tinel's test.            Assessment:       1. Left cervical radiculopathy        Plan:       Assessment & Plan    M25.512 Pain in left shoulder  M25.612 Stiffness of left shoulder, not elsewhere classified  M62.838 Other muscle spasm  R20.2 Paresthesia of skin  Z87.39 Personal history of other diseases of the musculoskeletal system and connective tissue  F12.121 Cannabis abuse with intoxication delirium  Z56.0 Unemployment, unspecified    IMPRESSION:  - Assessed left shoulder pain with possible cervical radiculopathy due to symptoms radiating down the arm.  - Noted crepitus and decreased range of motion in internal rotation.  - Positive Neer test with crepitus, negative Tinel's test.  - Considered potential intermittent nerve pinching in the neck as a cause of symptoms.    LEFT SHOULDER PAIN:  - Mr. 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  - Physical exam reveals crepitance with decreased range of motion to internal rotation, positive Neer test with crepitus, and no sensory or distal motor deficits.  - Assessment indicates the shoulder pain may be related to intermittent pinching of the nerve in the neck due to muscle tightness.  - Referred to Dr. Nava, orthopedist, for evaluation of shoulder pain and potential neck issues.  - Prescribed lidocaine patches for pain relief, to be applied for 12 hours in the morning and removed for 12 hours at night.    LEFT SHOULDER STIFFNESS:  - Mr. Bower experiences stiffness in the left shoulder, exacerbated by repetitive motion  from driving trucks.  - Physical exam confirms decreased range of motion to internal rotation and positive Neer test with crepitus.  - This stiffness may be related to the same intermittent nerve pinching in the neck due to muscle tightness as noted with the shoulder pain.    MUSCLE SPASMS:  - Mr. Bower reports muscle spasms and cramping, particularly in the shoulder area.  - Explained myofascial pain and its relation to muscle knots and tightness.  - Instructed on proper use of massage gun to help break up muscle spasms, emphasizing letting the vibration do the work without applying excessive pressure.    PARESTHESIA:  - Mr. Bower reports tingling and cramping in the shoulder area, with weakness in .  - Physical exam shows no sensory or distal motor deficits.  - Assessment indicates paresthesia may be related to the same intermittent nerve pinching in the neck due to muscle tightness noted with other symptoms.    HISTORY OF MUSCULOSKELETAL ISSUES:  - Mr. Bower has a history of rotator cuff pain and repetitive motion injuries from driving trucks, which contribute to current shoulder and neck complaints.    CANNABIS USE:  - Mr. Bower has used cannabis previously, which was suggested by a friend to alleviate symptoms.  - Discussed potential for THC to remain detectable in drug tests for up to 4 months after use.  - Counseled about the potential impact of cannabis use on drug screening and employment.    UNEMPLOYMENT:  - Mr. Bower has been unemployed for 6 months due to shoulder and neck issues, which have impacted his ability to work, particularly in his profession as a .    FOLLOW-UP:  - Mr. Bower to decide on COVID boosters as an optional choice.  - Will follow up with Dr. Nava, orthopedist, regarding comprehensive evaluation of shoulder pain, stiffn  ess, muscle spasms, and potential neck issues.        Bonifacio was seen today for follow-up.    Diagnoses and all orders for this visit:    Left  cervical radiculopathy  -     Ambulatory referral/consult to Orthopedics; Future  -     LIDOcaine (LIDODERM) 5 %; Place 1 patch onto the skin once daily. Remove & Discard patch within 12 hours or as directed by MD         Follow up in about 3 months (around 8/9/2025).  This note was generated with the assistance of ambient listening technology. Verbal consent was obtained by the patient and accompanying visitor(s) for the recording of patient appointment to facilitate this note. I attest to having reviewed and edited the generated note for accuracy, though some syntax or spelling errors may persist. Please contact the author of this note for any clarification.            [1]   Current Outpatient Medications   Medication Sig Dispense Refill    ergocalciferol (ERGOCALCIFEROL) 50,000 unit Cap Take 1 capsule (50,000 Units total) by mouth every 7 days. 4 capsule 11    LIDOcaine (LIDODERM) 5 % Place 1 patch onto the skin once daily. Remove & Discard patch within 12 hours or as directed by MD 90 patch 1     No current facility-administered medications for this visit.   [2]   Social History  Socioeconomic History    Marital status:    Tobacco Use    Smoking status: Never    Smokeless tobacco: Never   Substance and Sexual Activity    Alcohol use: Yes     Comment: occasionally    Drug use: No    Sexual activity: Yes     Partners: Female     Social Drivers of Health     Financial Resource Strain: Low Risk  (7/28/2024)    Overall Financial Resource Strain (CARDIA)     Difficulty of Paying Living Expenses: Not very hard   Food Insecurity: No Food Insecurity (7/28/2024)    Hunger Vital Sign     Worried About Running Out of Food in the Last Year: Never true     Ran Out of Food in the Last Year: Never true   Transportation Needs: No Transportation Needs (3/29/2023)    PRAPARE - Transportation     Lack of Transportation (Medical): No     Lack of Transportation (Non-Medical): No   Physical Activity: Insufficiently Active  (7/28/2024)    Exercise Vital Sign     Days of Exercise per Week: 2 days     Minutes of Exercise per Session: 20 min   Stress: Stress Concern Present (7/28/2024)    [employer] - Occupational Stress Questionnaire     Feeling of Stress : To some extent   Housing Stability: Unknown (3/29/2023)    Housing Stability Vital Sign     Unable to Pay for Housing in the Last Year: No     Unstable Housing in the Last Year: No

## 2025-05-12 ENCOUNTER — CLINICAL SUPPORT (OUTPATIENT)
Dept: REHABILITATION | Facility: HOSPITAL | Age: 40
End: 2025-05-12
Payer: MEDICAID

## 2025-05-12 DIAGNOSIS — M53.82 DECREASED RANGE OF MOTION OF INTERVERTEBRAL DISCS OF CERVICAL SPINE: ICD-10-CM

## 2025-05-12 DIAGNOSIS — R29.3 POOR POSTURE: Primary | ICD-10-CM

## 2025-05-12 PROCEDURE — 97110 THERAPEUTIC EXERCISES: CPT | Mod: PN

## 2025-05-12 NOTE — PROGRESS NOTES
Physical Therapy Visit    Patient Name: Bonifacio Bower  MRN: [id number]  YOB: 1985  Encounter Date: 5/12/2025    Therapy Diagnosis:   Encounter Diagnoses   Name Primary?    Poor posture Yes    Decreased range of motion of intervertebral discs of cervical spine      Physician: Jeffery Bruce MD    Physician Orders: Eval and Treat  Medical Diagnosis: Myofascial neck pain    Visit # / Visits Authorized:  10 / 20  Insurance Authorization Period: 2/21/2025 to 12/31/2025  Date of Evaluation: 2/21/2025  Plan of Care Certification: 2/21/2025 to 5/2/2025     PT/PTA: PT   Number of PTA visits since last PT visit:0  Time In: 1455   Time Out: 1553  Total Time (in minutes): 58   Total Billable Time (in minutes): 58    FOTO:  Intake Score: 67%  Survey Score 2: 63%  Survey Score 3: 72%    Precautions:       Subjective   Reports L sided neck pain and arm pain continues. He saw Teo yesterday. He does not have any new reports. The repeated L SB seems to give him the most relief..  Pain reported as 5/10. L sided neck and L arm pain    Objective            Treatment:  Therapeutic Exercise  TE 1: UBE 3 min FWD and 3 min BWD L5  TE 2: Prone Y and T 2 x 10 + 1lb DB  TE 3: chin retraction with rotation blue TB 10 reps each way  TE 4: + SFMA rolling prone to supine UE 5 reps each way  TE 5: quadruped thoracic rotation 15 reps each side  TE 6: Medx machine  TE 9: all peripheral machines  Therapeutic Activity  TA 1: baselines: pain with neck flexion  TA 2: repeated chin retraction L SB 10 reps, no effect  TA 3: repeated chin retraction L rotation 10 reps, no effect  TA 4: repeated chin retraction extension 10 reps, no effect  TA 5: repeated neck flexion self overpressure 10 reps, slight improvement in intensity  TA 6: sustained neck extension in prone 3 x 30 sec, a little better        5/12/2025     3:44 PM   [facility]   Visit Number 11   VAS Pain Rating 5   Cervical Weight 270 lbs   Repetitions 14   Rating of  Perceived Exertion 6     Time Entry(in minutes):  Therapeutic Exercise Time Entry: 58    Assessment & Plan   Assessment: Patient arrives with continuation of L sided upper thoracic and lower cervical neck pain. Repeated movements had no effect on pain today. Added SFMA and quadruped thoracic rotation today with good form and tolerance without complaints of pain. He only continues to get pain mostly with looking down and sitting postures. He is compliant with use of lumbar roll which he reports helps. He reports decreased intensity of pain for improvements but the pain continues to bother him daily. He performed 14 reps on medx machine at 6/10 RPE. He would benefit from decreasing medx weight at next tx session.  Evaluation/Treatment Tolerance: Patient tolerated treatment well    Patient will continue to benefit from skilled outpatient physical therapy to address the deficits listed in the problem list box on initial evaluation, provide pt/family education and to maximize pt's level of independence in the home and community environment.     Patient's spiritual, cultural, and educational needs considered and patient agreeable to plan of care and goals.           Plan: discharge at next tx session.    Goals:   Active       Long       - Pt will demonstrate independence with the HEP at discharge.  (Progressing)       Start:  02/21/25    Expected End:  05/02/25               Long Term Goal       - Pt will demonstratte increased cervical MedX ROM as measured by med ex by 6 degrees from initial test which results in functional ROM of neck for ease with ADLs and driving.  Appropriate and Ongoing  (Met)       Start:  02/21/25    Expected End:  05/02/25    Resolved:  03/24/25         - Pt will demonstrate increased MedX average isometric strength value  by 30% from initial test to improve ability to lift and carry, and sustain good posture while performing ADL's. Appropriate and Ongoing  (Progressing)       Start:  02/21/25     Expected End:  05/02/25            - Pt will demonstrate reduced pain and improved functional outcomes as reported on the FOTO by reaching an intake score of >/= 71% functional ability in order to demonstrate subjective improvement in patient's condition. . Appropriate and Ongoing  (Met)       Start:  02/21/25    Expected End:  05/02/25    Resolved:  04/21/25         - Pt will demonstrate independence with reducing or controlling symptoms with ther ex, movement, or position independently, able to reduce pain 2-4 points on pain scale using strategies taught in therapy. Appropriate and Ongoing  (Met)       Start:  02/21/25    Expected End:  05/02/25    Resolved:  04/21/25            Short Term Goal       - Pt will demonstratte increased cervical MedX ROM as measured by med ex by 3 degrees from initial test which results in improved  ROM of neck for ease with ADLs and driving. Appropriate and Ongoing  (Met)       Start:  02/21/25    Expected End:  05/02/25    Resolved:  03/24/25         - Pt will demonstrate independence with reducing or controlling symptoms with ther ex, movement, or position independently, able to reduce pain 1-2 points on pain scale using strategies taught in therapy.  Appropriate and Ongoing  (Met)       Start:  02/21/25    Expected End:  05/02/25    Resolved:  04/21/25         - Pt will demonstrate increased MedX average isometric strength value by 15% with  when compared to the initial testing resulting in improved ability to perform bending, lifting, and carrying activities safely and confidently. Appropriate and Ongoing  (Progressing)       Start:  02/21/25    Expected End:  05/02/25                Sera Wyman, PT

## 2025-05-22 PROBLEM — M53.82 DECREASED RANGE OF MOTION OF INTERVERTEBRAL DISCS OF CERVICAL SPINE: Status: RESOLVED | Noted: 2025-02-21 | Resolved: 2025-05-22

## 2025-05-22 PROBLEM — R29.3 POOR POSTURE: Status: RESOLVED | Noted: 2025-02-21 | Resolved: 2025-05-22

## 2025-06-02 ENCOUNTER — OFFICE VISIT (OUTPATIENT)
Dept: ORTHOPEDICS | Facility: CLINIC | Age: 40
End: 2025-06-02
Payer: MEDICAID

## 2025-06-02 VITALS — WEIGHT: 180 LBS | HEIGHT: 69 IN | BODY MASS INDEX: 26.66 KG/M2

## 2025-06-02 DIAGNOSIS — M54.12 LEFT CERVICAL RADICULOPATHY: ICD-10-CM

## 2025-06-02 PROCEDURE — 3008F BODY MASS INDEX DOCD: CPT | Mod: CPTII,,, | Performed by: ORTHOPAEDIC SURGERY

## 2025-06-02 PROCEDURE — 1160F RVW MEDS BY RX/DR IN RCRD: CPT | Mod: CPTII,,, | Performed by: ORTHOPAEDIC SURGERY

## 2025-06-02 PROCEDURE — 99999 PR PBB SHADOW E&M-EST. PATIENT-LVL III: CPT | Mod: PBBFAC,,, | Performed by: ORTHOPAEDIC SURGERY

## 2025-06-02 PROCEDURE — 1159F MED LIST DOCD IN RCRD: CPT | Mod: CPTII,,, | Performed by: ORTHOPAEDIC SURGERY

## 2025-06-02 PROCEDURE — 99213 OFFICE O/P EST LOW 20 MIN: CPT | Mod: PBBFAC,PN | Performed by: ORTHOPAEDIC SURGERY

## 2025-06-02 PROCEDURE — 99203 OFFICE O/P NEW LOW 30 MIN: CPT | Mod: S$PBB,,, | Performed by: ORTHOPAEDIC SURGERY

## 2025-06-06 ENCOUNTER — HOSPITAL ENCOUNTER (OUTPATIENT)
Dept: RADIOLOGY | Facility: HOSPITAL | Age: 40
Discharge: HOME | End: 2025-06-06
Attending: ORTHOPAEDIC SURGERY
Payer: MEDICAID

## 2025-06-06 DIAGNOSIS — M54.12 LEFT CERVICAL RADICULOPATHY: ICD-10-CM

## 2025-06-06 PROCEDURE — 72141 MRI NECK SPINE W/O DYE: CPT | Mod: 26,,, | Performed by: RADIOLOGY

## 2025-06-06 PROCEDURE — 72141 MRI NECK SPINE W/O DYE: CPT | Mod: TC,PO

## 2025-06-16 ENCOUNTER — OFFICE VISIT (OUTPATIENT)
Dept: ORTHOPEDICS | Facility: CLINIC | Age: 40
End: 2025-06-16
Payer: MEDICAID

## 2025-06-16 VITALS — WEIGHT: 179.88 LBS | BODY MASS INDEX: 26.64 KG/M2 | HEIGHT: 69 IN

## 2025-06-16 DIAGNOSIS — M47.812 ARTHROPATHY OF CERVICAL FACET JOINT: ICD-10-CM

## 2025-06-16 DIAGNOSIS — M48.02 CERVICAL STENOSIS OF SPINAL CANAL: Primary | ICD-10-CM

## 2025-06-16 DIAGNOSIS — M54.12 CERVICAL RADICULOPATHY: ICD-10-CM

## 2025-06-16 DIAGNOSIS — M50.30 DDD (DEGENERATIVE DISC DISEASE), CERVICAL: ICD-10-CM

## 2025-06-16 PROCEDURE — 1159F MED LIST DOCD IN RCRD: CPT | Mod: CPTII,,, | Performed by: ORTHOPAEDIC SURGERY

## 2025-06-16 PROCEDURE — 99213 OFFICE O/P EST LOW 20 MIN: CPT | Mod: S$PBB,,, | Performed by: ORTHOPAEDIC SURGERY

## 2025-06-16 PROCEDURE — 99212 OFFICE O/P EST SF 10 MIN: CPT | Mod: PBBFAC,PN | Performed by: ORTHOPAEDIC SURGERY

## 2025-06-16 PROCEDURE — 99999 PR PBB SHADOW E&M-EST. PATIENT-LVL II: CPT | Mod: PBBFAC,,, | Performed by: ORTHOPAEDIC SURGERY

## 2025-06-16 PROCEDURE — 1160F RVW MEDS BY RX/DR IN RCRD: CPT | Mod: CPTII,,, | Performed by: ORTHOPAEDIC SURGERY

## 2025-06-16 PROCEDURE — 3008F BODY MASS INDEX DOCD: CPT | Mod: CPTII,,, | Performed by: ORTHOPAEDIC SURGERY

## 2025-06-16 NOTE — PROGRESS NOTES
Subjective:       Patient ID: Bonifacio Bower is a 40 y.o. male.    Chief Complaint: Pain of the Neck (2 week f/u - cervical MRI results. No changes to previous appt.)      History of Present Illness    Prior to meeting with the patient I reviewed the medical chart in [facility]. This included reviewing the previous progress notes from our office, review of the patient's last appointment with their primary care provider, review of any visits to the emergency room, and review of any pain management appointments or procedures.      Ten month history of neck pain left-sided with numbness and tingling left arm.  Patient has had over 6 weeks of physical therapy to no avail symptoms on a daily basis worse with attempting to drive a motor vehicle no bowel or bladder incontinence no right arm symptoms though he did have some bursitis of the right shoulder symptoms actually been present for about 2 months but progressive worsening no bowel or bladder incontinence    Current Medications  Current Medications[1]    Allergies  Review of patient's allergies indicates:  No Known Allergies    Past Medical History  History reviewed. No pertinent past medical history.    Surgical History  Past Surgical History:   Procedure Laterality Date    FRACTURE SURGERY      right hand       Family History:   No family history on file.    Social History:   Social History[2]    Hospitalization/Major Diagnostic Procedure:     Review of Systems     General/Constitutional:  Chills denies. Fatigue denies. Fever denies. Weight gain denies. Weight loss denies.    Respiratory:  Shortness of breath denies.    Cardiovascular:  Chest pain denies.    Gastrointestinal:  Constipation denies. Diarrhea denies. Nausea denies. Vomiting denies.     Hematology:  Easy bruising denies. Prolonged bleeding denies.     Genitourinary:  Frequent urination denies. Pain in lower back denies. Painful urination denies.     Musculoskeletal:  See HPI for details    Skin:  Rash  denies.    Neurologic:  Dizziness denies. Gait abnormalities denies. Seizures denies. Tingling/Numbess denies.    Psychiatric:  Anxiety denies. Depressed mood denies.     Objective:   Vital Signs: There were no vitals filed for this visit.     Physical Exam      General Examination:     Constitutional: The patient is alert and oriented to lace person and time. Mood is pleasant.     Head/Face: Normal facial features normal eyebrows    Eyes: Normal extraocular motion bilaterally    Lungs: Respirations are equal and unlabored    Gait is coordinated.    Cardiovascular: There are no swelling or varicosities present.    Lymphatic: Negative for adenopathy    Skin: Normal    Neurological: Level of consciousness normal. Oriented to place person and time and situation    Psychiatric: Oriented to time place person and situation    Subjective numbness C6 nerve root distribution on the left side slight hyperreflexia upper and lower extremities Sai test negative bilaterally slight hyper reflexia both upper and lower extremities no clonus  XRAY Report/ Interpretation:  Cervical MRI personally reviewed evidence of central disc protrusion C5-6 and C6-7 with indentation of the spinal cord.  AP diameter of the spinal canal at both levels C5-6 and C6-7 approaches 9 mm please see report for details      Assessment:       1. Cervical stenosis of spinal canal    2. DDD (degenerative disc disease), cervical    3. Arthropathy of cervical facet joint    4. Cervical radiculopathy        Plan:       Bonifacio was seen today for pain.    Diagnoses and all orders for this visit:    Cervical stenosis of spinal canal    DDD (degenerative disc disease), cervical    Arthropathy of cervical facet joint    Cervical radiculopathy         Follow up in about 6 weeks (around 7/28/2025) for cervical pain f/u.    Patient has symptomatic cervical stenosis with radiculopathy.  We have discussed in detail his diagnosis prognosis and treatment options and  natural history of the condition his options are to live with his symptoms have an epidural steroid injection or surgical intervention.  I have explained that surgery is the only definitive treatment for the condition.    The risk associated with the spinal condition and an worsening of the condition was discussed with the patient expressed a thorough understanding.  The patient was warned about the possible development of cauda equina syndrome and its symptoms which include but are not limited to bowel or bladder dysfunction sexual dysfunction increasing pain increasing extremity numbness or weakness and saddle anesthesia.  The patient was advised to either contact me immediately or to go to the nearest hospital emergency room if symptoms of cauda equina syndrome with to develop.  The patient expressed an understanding of these instructions.      Treatment options were discussed with regards to the nature of the medical condition. Conservative pain intervention and surgical options were discussed in detail. The probability of success of each separate treatment option was discussed. The patient expressed a clear understanding of the treatment options. With regards to surgery, the procedure risk, benefits, complications, and outcomes were discussed. No guarantees were given with regards to surgical outcome.   The risk of complications, morbidity, and mortality of patient management decisions have been made at the time of this visit. These are associated with the patient's problems, diagnostic procedures and treatment options. This includes the possible management options selected and those considered but not selected by the patient after shared medical decision making we discussed with the patient.     This note was created using Dragon voice recognition software that occasionally misinterpreted phrases or words.         [1]   Current Outpatient Medications   Medication Sig Dispense Refill    ergocalciferol  (ERGOCALCIFEROL) 50,000 unit Cap Take 1 capsule (50,000 Units total) by mouth every 7 days. 4 capsule 11    LIDOcaine (LIDODERM) 5 % Place 1 patch onto the skin once daily. Remove & Discard patch within 12 hours or as directed by MD Faulkner patch 1     No current facility-administered medications for this visit.   [2]   Social History  Socioeconomic History    Marital status:    Tobacco Use    Smoking status: Never    Smokeless tobacco: Never   Substance and Sexual Activity    Alcohol use: Yes     Comment: occasionally    Drug use: No    Sexual activity: Yes     Partners: Female     Social Drivers of Health     Financial Resource Strain: Low Risk  (7/28/2024)    Overall Financial Resource Strain (CARDIA)     Difficulty of Paying Living Expenses: Not very hard   Food Insecurity: No Food Insecurity (7/28/2024)    Hunger Vital Sign     Worried About Running Out of Food in the Last Year: Never true     Ran Out of Food in the Last Year: Never true   Transportation Needs: No Transportation Needs (3/29/2023)    PRAPARE - Transportation     Lack of Transportation (Medical): No     Lack of Transportation (Non-Medical): No   Physical Activity: Insufficiently Active (7/28/2024)    Exercise Vital Sign     Days of Exercise per Week: 2 days     Minutes of Exercise per Session: 20 min   Stress: Stress Concern Present (7/28/2024)    [employer] [place] [employer] - Occupational Stress Questionnaire     Feeling of Stress : To some extent   Housing Stability: Unknown (3/29/2023)    Housing Stability Vital Sign     Unable to Pay for Housing in the Last Year: No     Unstable Housing in the Last Year: No

## 2025-07-28 ENCOUNTER — OFFICE VISIT (OUTPATIENT)
Dept: ORTHOPEDICS | Facility: CLINIC | Age: 40
End: 2025-07-28
Payer: MEDICAID

## 2025-07-28 VITALS — HEIGHT: 69 IN | WEIGHT: 179.88 LBS | BODY MASS INDEX: 26.64 KG/M2

## 2025-07-28 DIAGNOSIS — M48.02 CERVICAL STENOSIS OF SPINAL CANAL: Primary | ICD-10-CM

## 2025-07-28 DIAGNOSIS — M47.812 ARTHROPATHY OF CERVICAL FACET JOINT: ICD-10-CM

## 2025-07-28 DIAGNOSIS — M54.12 LEFT CERVICAL RADICULOPATHY: ICD-10-CM

## 2025-07-28 PROCEDURE — 99999 PR PBB SHADOW E&M-EST. PATIENT-LVL III: CPT | Mod: PBBFAC,,, | Performed by: ORTHOPAEDIC SURGERY

## 2025-07-28 PROCEDURE — 99213 OFFICE O/P EST LOW 20 MIN: CPT | Mod: PBBFAC,PN | Performed by: ORTHOPAEDIC SURGERY

## 2025-07-28 PROCEDURE — 3008F BODY MASS INDEX DOCD: CPT | Mod: CPTII,,, | Performed by: ORTHOPAEDIC SURGERY

## 2025-07-28 PROCEDURE — 1159F MED LIST DOCD IN RCRD: CPT | Mod: CPTII,,, | Performed by: ORTHOPAEDIC SURGERY

## 2025-07-28 PROCEDURE — 99213 OFFICE O/P EST LOW 20 MIN: CPT | Mod: S$PBB,,, | Performed by: ORTHOPAEDIC SURGERY

## 2025-07-28 PROCEDURE — 1160F RVW MEDS BY RX/DR IN RCRD: CPT | Mod: CPTII,,, | Performed by: ORTHOPAEDIC SURGERY

## 2025-07-28 NOTE — PROGRESS NOTES
Subjective:       Patient ID: Bonifacio Bower is a 40 y.o. male.    Chief Complaint: Pain of the Neck (Follow up - cervical pain. Today, states his pain level is the same. Has decreased his activities which has helped his pain. Pain located at the midline that will occasionally radiate into the L arm terminating at the wrist. )      History of Present Illness    Prior to meeting with the patient I reviewed the medical chart in [facility]. This included reviewing the previous progress notes from our office, review of the patient's last appointment with their primary care provider, review of any visits to the emergency room, and review of any pain management appointments or procedures.       history of neck pain left-sided with numbness and tingling left arm.   Left arm weakness and tingling slightly improved   Patient has had over 6 weeks of physical therapy to no avail symptoms on a daily basis worse with attempting to drive a motor vehicle no bowel or bladder incontinence no right arm symptoms though he did have some bursitis of the right shoulder symptoms actually been present for about 2 months but progressive worsening no bowel or bladder incontinence MRI was performed last time and discussed patient did have evidence of cervical stenosis here for follow-up treatment options were discussed at the time of his last visit still has some radicular symptoms in the left arm but there is slightly improved    Current Medications  Current Medications[1]    Allergies  Review of patient's allergies indicates:  No Known Allergies    Past Medical History  History reviewed. No pertinent past medical history.    Surgical History  Past Surgical History:   Procedure Laterality Date    FRACTURE SURGERY      right hand       Family History:   No family history on file.    Social History:   Social History[2]    Hospitalization/Major Diagnostic Procedure:     Review of Systems     General/Constitutional:  Chills denies. Fatigue denies.  Fever denies. Weight gain denies. Weight loss denies.    Respiratory:  Shortness of breath denies.    Cardiovascular:  Chest pain denies.    Gastrointestinal:  Constipation denies. Diarrhea denies. Nausea denies. Vomiting denies.     Hematology:  Easy bruising denies. Prolonged bleeding denies.     Genitourinary:  Frequent urination denies. Pain in lower back denies. Painful urination denies.     Musculoskeletal:  See HPI for details    Skin:  Rash denies.    Neurologic:  Dizziness denies. Gait abnormalities denies. Seizures denies. Tingling/Numbess denies.    Psychiatric:  Anxiety denies. Depressed mood denies.     Objective:   Vital Signs: There were no vitals filed for this visit.     Physical Exam      General Examination:     Constitutional: The patient is alert and oriented to lace person and time. Mood is pleasant.     Head/Face: Normal facial features normal eyebrows    Eyes: Normal extraocular motion bilaterally    Lungs: Respirations are equal and unlabored    Gait is coordinated.    Cardiovascular: There are no swelling or varicosities present.    Lymphatic: Negative for adenopathy    Skin: Normal    Neurological: Level of consciousness normal. Oriented to place person and time      Psychiatric: Oriented to time plac e person and situation      Subjective numbness C6 nerve root distribution on the left side slight hyperreflexia upper and lower extremities Sai test negative bilaterally slight hyper reflexia both upper and lower extremities no clonus  XRAY Report/ Interpretation:  Cervical MRI personally reviewed evidence of central disc protrusion C5-6 and C6-7 with indentation of the spinal cord.  AP diameter of the spinal canal at both levels C5-6 and C6-7 approaches 9 mm please see report for details           Assessment:       1. Cervical stenosis of spinal canal    2. Left cervical radiculopathy        Plan:       Bonifacio was seen today for pain.    Diagnoses and all orders for this  visit:    Cervical stenosis of spinal canal    Left cervical radiculopathy         No follow-ups on file.    Cervical radiculopathy on the left with foraminal stenosis noted treatment options were discussed patient referred to pain management at this time not interested in surgery  The risk associated with the spinal condition and an worsening of the condition was discussed with the patient expressed a thorough understanding.  The patient was warned about the possible development of cauda equina syndrome and its symptoms which include but are not limited to bowel or bladder dysfunction sexual dysfunction increasing pain increasing extremity numbness or weakness and saddle anesthesia.  The patient was advised to either contact me immediately or to go to the nearest hospital emergency room if symptoms of cauda equina syndrome with to develop.  The patient expressed an understanding of these instructions.  Treatment options were discussed with regards to the nature of the medical condition. Conservative pain intervention and surgical options were discussed in detail. The probability of success of each separate treatment option was discussed. The patient expressed a clear understanding of the treatment options. With regards to surgery, the procedure risk, benefits, complications, and outcomes were discussed. No guarantees were given with regards to surgical outcome.   The risk of complications, morbidity, and mortality of patient management decisions have been made at the time of this visit. These are associated with the patient's problems, diagnostic procedures and treatment options. This includes the possible management options selected and those considered but not selected by the patient after shared medical decision making we discussed with the patient.     This note was created using Dragon voice recognition software that occasionally misinterpreted phrases or words.         [1]   Current Outpatient Medications    Medication Sig Dispense Refill    ergocalciferol (ERGOCALCIFEROL) 50,000 unit Cap Take 1 capsule (50,000 Units total) by mouth every 7 days. 4 capsule 11    LIDOcaine (LIDODERM) 5 % Place 1 patch onto the skin once daily. Remove & Discard patch within 12 hours or as directed by MD 90 patch 1     No current facility-administered medications for this visit.   [2]   Social History  Socioeconomic History    Marital status:    Tobacco Use    Smoking status: Never    Smokeless tobacco: Never   Substance and Sexual Activity    Alcohol use: Yes     Comment: occasionally    Drug use: No    Sexual activity: Yes     Partners: Female     Social Drivers of Health     Financial Resource Strain: Low Risk  (7/28/2024)    Overall Financial Resource Strain (CARDIA)     Difficulty of Paying Living Expenses: Not very hard   Food Insecurity: No Food Insecurity (7/28/2024)    Hunger Vital Sign     Worried About Running Out of Food in the Last Year: Never true     Ran Out of Food in the Last Year: Never true   Transportation Needs: No Transportation Needs (3/29/2023)    PRAPARE - Transportation     Lack of Transportation (Medical): No     Lack of Transportation (Non-Medical): No   Physical Activity: Insufficiently Active (7/28/2024)    Exercise Vital Sign     Days of Exercise per Week: 2 days     Minutes of Exercise per Session: 20 min   Stress: Stress Concern Present (7/28/2024)    [employer] [place] [employer] - Occupational Stress Questionnaire     Feeling of Stress : To some extent   Housing Stability: Unknown (3/29/2023)    Housing Stability Vital Sign     Unable to Pay for Housing in the Last Year: No     Unstable Housing in the Last Year: No

## 2025-08-15 ENCOUNTER — OFFICE VISIT (OUTPATIENT)
Dept: FAMILY MEDICINE | Facility: CLINIC | Age: 40
End: 2025-08-15
Payer: MEDICAID

## 2025-08-15 VITALS
OXYGEN SATURATION: 98 % | BODY MASS INDEX: 25.39 KG/M2 | SYSTOLIC BLOOD PRESSURE: 122 MMHG | WEIGHT: 171.94 LBS | TEMPERATURE: 99 F | DIASTOLIC BLOOD PRESSURE: 72 MMHG | HEART RATE: 86 BPM

## 2025-08-15 DIAGNOSIS — M48.02 CERVICAL STENOSIS OF SPINAL CANAL: ICD-10-CM

## 2025-08-15 DIAGNOSIS — E55.9 VITAMIN D DEFICIENCY: Primary | ICD-10-CM

## 2025-08-15 DIAGNOSIS — M54.12 LEFT CERVICAL RADICULOPATHY: ICD-10-CM

## 2025-08-15 DIAGNOSIS — Z00.00 HEALTHCARE MAINTENANCE: ICD-10-CM

## 2025-08-15 PROCEDURE — 99999 PR PBB SHADOW E&M-EST. PATIENT-LVL III: CPT | Mod: PBBFAC,,, | Performed by: NURSE PRACTITIONER

## 2025-08-15 PROCEDURE — 99213 OFFICE O/P EST LOW 20 MIN: CPT | Mod: PBBFAC,PN | Performed by: NURSE PRACTITIONER

## 2025-08-21 ENCOUNTER — TELEPHONE (OUTPATIENT)
Dept: ORTHOPEDICS | Facility: CLINIC | Age: 40
End: 2025-08-21
Payer: MEDICAID

## 2025-08-21 DIAGNOSIS — M47.812 ARTHROPATHY OF CERVICAL FACET JOINT: ICD-10-CM

## 2025-08-21 DIAGNOSIS — M54.12 LEFT CERVICAL RADICULOPATHY: ICD-10-CM

## 2025-08-21 DIAGNOSIS — M48.02 CERVICAL STENOSIS OF SPINAL CANAL: Primary | ICD-10-CM
